# Patient Record
Sex: MALE | Race: WHITE | NOT HISPANIC OR LATINO | Employment: OTHER | URBAN - METROPOLITAN AREA
[De-identification: names, ages, dates, MRNs, and addresses within clinical notes are randomized per-mention and may not be internally consistent; named-entity substitution may affect disease eponyms.]

---

## 2017-04-03 RX ORDER — PRAVASTATIN SODIUM 40 MG/1
TABLET ORAL
Qty: 30 TABLET | Refills: 3 | Status: SHIPPED | OUTPATIENT
Start: 2017-04-03 | End: 2017-09-26 | Stop reason: SDUPTHER

## 2017-09-04 ENCOUNTER — PATIENT MESSAGE (OUTPATIENT)
Dept: FAMILY MEDICINE | Facility: CLINIC | Age: 57
End: 2017-09-04

## 2017-09-26 RX ORDER — PRAVASTATIN SODIUM 40 MG/1
TABLET ORAL
Qty: 30 TABLET | Refills: 3 | Status: SHIPPED | OUTPATIENT
Start: 2017-09-26 | End: 2018-06-11

## 2017-09-26 RX ORDER — PRAVASTATIN SODIUM 40 MG/1
40 TABLET ORAL DAILY
Qty: 30 TABLET | Refills: 3 | Status: SHIPPED | OUTPATIENT
Start: 2017-09-26 | End: 2018-03-31 | Stop reason: SDUPTHER

## 2018-03-31 RX ORDER — PRAVASTATIN SODIUM 40 MG/1
TABLET ORAL
Qty: 30 TABLET | Refills: 3 | Status: SHIPPED | OUTPATIENT
Start: 2018-03-31 | End: 2018-04-02 | Stop reason: SDUPTHER

## 2018-04-02 DIAGNOSIS — Z00.00 ROUTINE GENERAL MEDICAL EXAMINATION AT A HEALTH CARE FACILITY: Primary | ICD-10-CM

## 2018-04-02 DIAGNOSIS — E78.5 HYPERLIPIDEMIA, UNSPECIFIED HYPERLIPIDEMIA TYPE: ICD-10-CM

## 2018-04-02 RX ORDER — PRAVASTATIN SODIUM 40 MG/1
40 TABLET ORAL DAILY
Qty: 90 TABLET | Refills: 0 | Status: SHIPPED | OUTPATIENT
Start: 2018-04-02 | End: 2018-06-11

## 2018-04-02 NOTE — TELEPHONE ENCOUNTER
Please send to [facility] in [place]     pravastatin (PRAVACHOL) 40 MG tablet  Take 1 tablet (40 mg total) by mouth once daily.   Normal, Disp-30 tablet, R-3   Generic For:*PRAVACHOL  40MG

## 2018-04-04 LAB
ALBUMIN SERPL-MCNC: 4.2 G/DL (ref 3.6–5.1)
ALBUMIN/GLOB SERPL: 1.7 (CALC) (ref 1–2.5)
ALP SERPL-CCNC: 84 U/L (ref 40–115)
ALT SERPL-CCNC: 29 U/L (ref 9–46)
AST SERPL-CCNC: 22 U/L (ref 10–35)
BASOPHILS # BLD AUTO: 72 CELLS/UL (ref 0–200)
BASOPHILS NFR BLD AUTO: 1.5 %
BILIRUB SERPL-MCNC: 0.6 MG/DL (ref 0.2–1.2)
BUN SERPL-MCNC: 18 MG/DL (ref 7–25)
BUN/CREAT SERPL: NORMAL (CALC) (ref 6–22)
CALCIUM SERPL-MCNC: 9.1 MG/DL (ref 8.6–10.3)
CHLORIDE SERPL-SCNC: 106 MMOL/L (ref 98–110)
CHOLEST SERPL-MCNC: 216 MG/DL
CHOLEST/HDLC SERPL: 3.8 (CALC)
CO2 SERPL-SCNC: 26 MMOL/L (ref 20–31)
CREAT SERPL-MCNC: 1.1 MG/DL (ref 0.7–1.33)
EOSINOPHIL # BLD AUTO: 490 CELLS/UL (ref 15–500)
EOSINOPHIL NFR BLD AUTO: 10.2 %
ERYTHROCYTE [DISTWIDTH] IN BLOOD BY AUTOMATED COUNT: 12.4 % (ref 11–15)
GFR SERPL CREATININE-BSD FRML MDRD: 75 ML/MIN/1.73M2
GLOBULIN SER CALC-MCNC: 2.5 G/DL (CALC) (ref 1.9–3.7)
GLUCOSE SERPL-MCNC: 96 MG/DL (ref 65–99)
HCT VFR BLD AUTO: 42.3 % (ref 38.5–50)
HDLC SERPL-MCNC: 57 MG/DL
HGB BLD-MCNC: 14.8 G/DL (ref 13.2–17.1)
LDLC SERPL CALC-MCNC: 139 MG/DL (CALC)
LYMPHOCYTES # BLD AUTO: 1267 CELLS/UL (ref 850–3900)
LYMPHOCYTES NFR BLD AUTO: 26.4 %
MCH RBC QN AUTO: 33 PG (ref 27–33)
MCHC RBC AUTO-ENTMCNC: 35 G/DL (ref 32–36)
MCV RBC AUTO: 94.2 FL (ref 80–100)
MONOCYTES # BLD AUTO: 701 CELLS/UL (ref 200–950)
MONOCYTES NFR BLD AUTO: 14.6 %
NEUTROPHILS # BLD AUTO: 2270 CELLS/UL (ref 1500–7800)
NEUTROPHILS NFR BLD AUTO: 47.3 %
NONHDLC SERPL-MCNC: 159 MG/DL (CALC)
PLATELET # BLD AUTO: 231 THOUSAND/UL (ref 140–400)
PMV BLD REES-ECKER: 10.4 FL (ref 7.5–12.5)
POTASSIUM SERPL-SCNC: 4.4 MMOL/L (ref 3.5–5.3)
PROT SERPL-MCNC: 6.7 G/DL (ref 6.1–8.1)
RBC # BLD AUTO: 4.49 MILLION/UL (ref 4.2–5.8)
SODIUM SERPL-SCNC: 139 MMOL/L (ref 135–146)
TRIGL SERPL-MCNC: 96 MG/DL
TSH SERPL-ACNC: 2.83 MIU/L (ref 0.4–4.5)
WBC # BLD AUTO: 4.8 THOUSAND/UL (ref 3.8–10.8)

## 2018-04-20 ENCOUNTER — PATIENT MESSAGE (OUTPATIENT)
Dept: FAMILY MEDICINE | Facility: CLINIC | Age: 58
End: 2018-04-20

## 2018-06-04 ENCOUNTER — PATIENT MESSAGE (OUTPATIENT)
Dept: FAMILY MEDICINE | Facility: CLINIC | Age: 58
End: 2018-06-04

## 2018-06-11 ENCOUNTER — OFFICE VISIT (OUTPATIENT)
Dept: FAMILY MEDICINE | Facility: CLINIC | Age: 58
End: 2018-06-11
Payer: COMMERCIAL

## 2018-06-11 VITALS
BODY MASS INDEX: 28.03 KG/M2 | DIASTOLIC BLOOD PRESSURE: 86 MMHG | HEART RATE: 61 BPM | HEIGHT: 70 IN | SYSTOLIC BLOOD PRESSURE: 116 MMHG | TEMPERATURE: 98 F | OXYGEN SATURATION: 99 % | WEIGHT: 195.81 LBS

## 2018-06-11 DIAGNOSIS — Z00.00 ROUTINE HEALTH MAINTENANCE: Primary | ICD-10-CM

## 2018-06-11 DIAGNOSIS — E78.5 HYPERLIPIDEMIA, UNSPECIFIED HYPERLIPIDEMIA TYPE: ICD-10-CM

## 2018-06-11 DIAGNOSIS — Z23 NEED FOR TDAP VACCINATION: ICD-10-CM

## 2018-06-11 PROCEDURE — 99396 PREV VISIT EST AGE 40-64: CPT | Mod: 25,S$GLB,, | Performed by: FAMILY MEDICINE

## 2018-06-11 PROCEDURE — 90715 TDAP VACCINE 7 YRS/> IM: CPT | Mod: S$GLB,,, | Performed by: FAMILY MEDICINE

## 2018-06-11 PROCEDURE — 90471 IMMUNIZATION ADMIN: CPT | Mod: S$GLB,,, | Performed by: FAMILY MEDICINE

## 2018-06-11 RX ORDER — ATORVASTATIN CALCIUM 40 MG/1
40 TABLET, FILM COATED ORAL DAILY
Qty: 90 TABLET | Refills: 3 | Status: SHIPPED | OUTPATIENT
Start: 2018-06-11 | End: 2018-11-19

## 2018-06-11 NOTE — PROGRESS NOTES
" Patient ID: Morgan Marshall III is a 57 y.o. male.    Chief Complaint: Annual Exam    HPI      Morgan Marshall III is a 57 y.o. male. here for annual exam.   No current complaints.  Doing well overall.  Lipidemia-taking Pravachol without problems that she was on Lipitor at some point in the distant past unsure what changed to Pravachol.  Possibly insurance .      Review of Symptoms    Constitutional: Negative.    HENT: Negative.    Eyes: Negative.    Respiratory: Negative.    Cardiovascular: Negative.    Gastrointestinal: Negative.    Endocrine: Negative.    Genitourinary: Negative.    Musculoskeletal: Negative.    Skin: Negative.    Allergic/Immunologic: Negative.    Neurological: Negative.    Hematological: Negative.    Psychiatric/Behavioral: Negative.      Except as above in HPI      /86 (BP Location: Left arm, Patient Position: Sitting, BP Method: Large (Manual))   Pulse 61   Temp 97.7 °F (36.5 °C)   Ht 5' 10" (1.778 m)   Wt 88.8 kg (195 lb 12.8 oz)   SpO2 99%   BMI 28.09 kg/m²     Physical  Exam    Constitutional:  Oriented to person, place, and time. Appears well-developed and well-nourished.     HENT:   Head: Normocephalic and atraumatic.     Right Ear: Tympanic membrane, external ear and ear canal normal.     Left Ear: Tympanic membrane, external ear and ear canal normal.     Nose: Nose normal. No rhinorrhea or nasal deformity.     Mouth/Throat: Uvula is midline, oropharynx is clear and moist and mucous membranes are normal.      Eyes: Conjunctivae are normal. Right eye exhibits no discharge. Left eye exhibits no discharge. No scleral icterus.     Neck:  No JVD present. No tracheal deviation  [x]  Neck supple.   [x]  No Carotid bruit    Cardiovascular: Normal rate, regular rhythm and normal heart sounds.      Pulmonary/Chest: Effort normal and breath sounds normal. No stridor. No respiratory distress. No wheezes. No rales.      Musculoskeletal: Normal range of motion. No edema or tenderness. "   No deformity     Lymphadenopathy:  No cervical adenopathy.     Neurological:  Alert and oriented to person, place, and time. Coordination normal.     Skin: Skin is warm and dry. No rash noted.     Psychiatric: Normal mood and affect. Speech is normal and behavior is normal. Judgment and thought content normal.     Complete Blood Count  Lab Results   Component Value Date    RBC 4.49 04/03/2018    HGB 14.8 04/03/2018    HCT 42.3 04/03/2018    MCV 94.2 04/03/2018    MCH 33.0 04/03/2018    MCHC 35.0 04/03/2018    RDW 12.4 04/03/2018     04/03/2018    MPV 10.4 04/03/2018    GRAN 3.1 10/03/2016    GRAN 55.3 10/03/2016    LYMPH 1,267 04/03/2018    LYMPH 26.4 04/03/2018    MONO 701 04/03/2018    MONO 14.6 04/03/2018     04/03/2018    BASO 72 04/03/2018    EOSINOPHIL 10.2 04/03/2018    BASOPHIL 1.5 04/03/2018    DIFFMETHOD Automated 10/03/2016       Comprehensive Metabolic Panel  Lab Results   Component Value Date    GLU 96 04/03/2018    BUN 18 04/03/2018    CREATININE 1.10 04/03/2018     04/03/2018    K 4.4 04/03/2018     04/03/2018    PROT 6.7 04/03/2018    ALBUMIN 4.2 04/03/2018    BILITOT 0.6 04/03/2018    AST 22 04/03/2018    ALKPHOS 84 04/03/2018    CO2 26 04/03/2018    ALT 29 04/03/2018    EGFRNONAA 75 04/03/2018    ESTGFRAFRICA 87 04/03/2018       TSH  Lab Results   Component Value Date    TSH 2.83 04/03/2018       Assessment / Plan:      ICD-10-CM ICD-9-CM   1. Routine health maintenance Z00.00 V70.0     Routine health maintenance    Other orders  -     (In Office Administered) Tdap Vaccine  -     Hepatitis C antibody; Future; Expected date: 06/11/2018        Change to Lipitor to help LDL  Discussed how to stay healthy including: diet, exercise, refraining from smoking and discussed screening exams / tests needed for age, sex and family Hx.

## 2019-01-25 ENCOUNTER — TELEPHONE (OUTPATIENT)
Dept: FAMILY MEDICINE | Facility: CLINIC | Age: 59
End: 2019-01-25

## 2019-01-25 NOTE — TELEPHONE ENCOUNTER
----- Message from Natalia Eden sent at 1/25/2019 12:18 PM CST -----  Contact: self, [phone]  Patient requests to be seen by his doctor on Monday 1/28 for surgery clearance.

## 2019-01-28 ENCOUNTER — OFFICE VISIT (OUTPATIENT)
Dept: FAMILY MEDICINE | Facility: CLINIC | Age: 59
End: 2019-01-28
Payer: COMMERCIAL

## 2019-01-28 VITALS
SYSTOLIC BLOOD PRESSURE: 122 MMHG | WEIGHT: 197.06 LBS | TEMPERATURE: 99 F | HEIGHT: 70 IN | HEART RATE: 69 BPM | DIASTOLIC BLOOD PRESSURE: 88 MMHG | OXYGEN SATURATION: 100 % | BODY MASS INDEX: 28.21 KG/M2

## 2019-01-28 DIAGNOSIS — Z23 NEED FOR INFLUENZA VACCINATION: ICD-10-CM

## 2019-01-28 DIAGNOSIS — Z01.818 PREOPERATIVE EXAMINATION: Primary | ICD-10-CM

## 2019-01-28 DIAGNOSIS — E78.5 HYPERLIPIDEMIA, UNSPECIFIED HYPERLIPIDEMIA TYPE: ICD-10-CM

## 2019-01-28 PROCEDURE — 99213 PR OFFICE/OUTPT VISIT, EST, LEVL III, 20-29 MIN: ICD-10-PCS | Mod: S$GLB,,, | Performed by: FAMILY MEDICINE

## 2019-01-28 PROCEDURE — 3008F BODY MASS INDEX DOCD: CPT | Mod: CPTII,S$GLB,, | Performed by: FAMILY MEDICINE

## 2019-01-28 PROCEDURE — 3008F PR BODY MASS INDEX (BMI) DOCUMENTED: ICD-10-PCS | Mod: CPTII,S$GLB,, | Performed by: FAMILY MEDICINE

## 2019-01-28 PROCEDURE — 99213 OFFICE O/P EST LOW 20 MIN: CPT | Mod: S$GLB,,, | Performed by: FAMILY MEDICINE

## 2019-01-28 NOTE — PROGRESS NOTES
" Patient ID: Morgan Marshall III is a 58 y.o. male.    Chief Complaint: Pre-op Exam    HPI      Morgan Marshall III is a 58 y.o. male. here for examination prior to surgery for cervical next for.  Patient with over 10 year history of neck pain with neuralgia.  Physical therapy and traction were helpful.  Recently experiencing weakness of his left arm.  Follow-up with Neurosurgery who recommended surgical intervention.  The no current cardiovascular problems. No respiratory problems.  Recent lab work reviewed and normal including EKG and chest x-ray reports.      Review of Symptoms    Constitutional: Negative.    HENT: Negative.    Eyes: Negative.    Respiratory: Negative.    Cardiovascular: Negative.    Gastrointestinal: Negative.    Endocrine: Negative.    Genitourinary: Negative.    Musculoskeletal: Negative.    Skin: Negative.    Allergic/Immunologic: Negative.    Neurological: Negative.    Hematological: Negative.    Psychiatric/Behavioral: Negative.      Except as above in HPI      /88   Pulse 69   Temp 98.5 °F (36.9 °C)   Ht 5' 10" (1.778 m)   Wt 89.4 kg (197 lb 1.5 oz)   SpO2 100%   BMI 28.28 kg/m²     Physical  Exam    Constitutional:  Oriented to person, place, and time. Appears well-developed and well-nourished.     HENT:   Head: Normocephalic and atraumatic.     Right Ear: Tympanic membrane, ear canal and External ear normal     Left Ear: Tympanic membrane, ear canal and External ear normal     Nose: Nose normal. No rhinorrhea or nasal deformity.     Mouth/Throat: Uvula is midline, oropharynx is clear and moist and mucous membranes are normal.      Eyes: Conjunctivae are normal. Right eye exhibits no discharge. Left eye exhibits no discharge. No scleral icterus.     Neck:  No JVD present. No tracheal deviation  []  Neck supple.   []  No Carotid bruit    Cardiovascular:  Regular rate and rhythm with normal S1 and S2     Pulmonary/Chest:   Clear to auscultation bilaterally without wheezes, " rhonchi or rales    Musculoskeletal: Normal range of motion. No edema or tenderness.   No deformity   Weakness left extremity hands and biceps    Lymphadenopathy:  No cervical adenopathy.     Neurological:  Alert and oriented to person, place, and time. Coordination normal.     Skin: Skin is warm and dry. No rash noted.     Psychiatric: Normal mood and affect. Speech is normal and behavior is normal. Judgment and thought content normal.     Complete Blood Count  Lab Results   Component Value Date    RBC 4.49 04/03/2018    HGB 14.8 04/03/2018    HCT 42.3 04/03/2018    MCV 94.2 04/03/2018    MCH 33.0 04/03/2018    MCHC 35.0 04/03/2018    RDW 12.4 04/03/2018     04/03/2018    MPV 10.4 04/03/2018    GRAN 3.1 10/03/2016    GRAN 55.3 10/03/2016    LYMPH 1,267 04/03/2018    LYMPH 26.4 04/03/2018    MONO 701 04/03/2018    MONO 14.6 04/03/2018     04/03/2018    BASO 72 04/03/2018    EOSINOPHIL 10.2 04/03/2018    BASOPHIL 1.5 04/03/2018    DIFFMETHOD Automated 10/03/2016       Comprehensive Metabolic Panel  No results found for: GLU, BUN, CREATININE, NA, K, CL, PROT, ALBUMIN, BILITOT, AST, ALKPHOS, CO2, ALT, ANIONGAP, EGFRNONAA, ESTGFRAFRICA    TSH  No results found for: TSH    Assessment / Plan:      ICD-10-CM ICD-9-CM   1. Preoperative examination Z01.818 V72.84   2. Need for influenza vaccination Z23 V04.81   3. Hyperlipidemia, unspecified hyperlipidemia type E78.5 272.4     Preoperative examination    Need for influenza vaccination  -     Influenza - Quadrivalent (3 years & older) (PF)    Hyperlipidemia, unspecified hyperlipidemia type  -     Lipid panel; Future          Discussed how to stay healthy including: diet, exercise, refraining from smoking and discussed screening exams / tests needed for age, sex and family Hx.

## 2019-02-22 LAB
CHOLEST SERPL-MCNC: 205 MG/DL
CHOLEST/HDLC SERPL: 3.4 (CALC)
HDLC SERPL-MCNC: 61 MG/DL
LDLC SERPL CALC-MCNC: 125 MG/DL (CALC)
NONHDLC SERPL-MCNC: 144 MG/DL (CALC)
TRIGL SERPL-MCNC: 88 MG/DL

## 2019-07-11 ENCOUNTER — PATIENT MESSAGE (OUTPATIENT)
Dept: FAMILY MEDICINE | Facility: CLINIC | Age: 59
End: 2019-07-11

## 2019-09-23 ENCOUNTER — OFFICE VISIT (OUTPATIENT)
Dept: FAMILY MEDICINE | Facility: CLINIC | Age: 59
End: 2019-09-23
Payer: COMMERCIAL

## 2019-09-23 ENCOUNTER — PATIENT MESSAGE (OUTPATIENT)
Dept: FAMILY MEDICINE | Facility: CLINIC | Age: 59
End: 2019-09-23

## 2019-09-23 VITALS
TEMPERATURE: 98 F | DIASTOLIC BLOOD PRESSURE: 82 MMHG | SYSTOLIC BLOOD PRESSURE: 100 MMHG | HEART RATE: 68 BPM | BODY MASS INDEX: 27.75 KG/M2 | HEIGHT: 70 IN | OXYGEN SATURATION: 96 % | WEIGHT: 193.81 LBS

## 2019-09-23 DIAGNOSIS — R10.9 RIGHT FLANK PAIN: Primary | ICD-10-CM

## 2019-09-23 PROCEDURE — 3008F PR BODY MASS INDEX (BMI) DOCUMENTED: ICD-10-PCS | Mod: CPTII,S$GLB,, | Performed by: FAMILY MEDICINE

## 2019-09-23 PROCEDURE — 3008F BODY MASS INDEX DOCD: CPT | Mod: CPTII,S$GLB,, | Performed by: FAMILY MEDICINE

## 2019-09-23 PROCEDURE — 99213 OFFICE O/P EST LOW 20 MIN: CPT | Mod: S$GLB,,, | Performed by: FAMILY MEDICINE

## 2019-09-23 PROCEDURE — 99213 PR OFFICE/OUTPT VISIT, EST, LEVL III, 20-29 MIN: ICD-10-PCS | Mod: S$GLB,,, | Performed by: FAMILY MEDICINE

## 2019-09-23 RX ORDER — CYCLOBENZAPRINE HCL 5 MG
5 TABLET ORAL 3 TIMES DAILY PRN
Qty: 30 TABLET | Refills: 1 | Status: SHIPPED | OUTPATIENT
Start: 2019-09-23 | End: 2019-10-03

## 2019-09-29 NOTE — PROGRESS NOTES
" Patient ID: Morgan Marshall III is a 59 y.o. male.    Chief Complaint: Abdominal Pain    HPI      Morgan Marshall III is a 59 y.o. male complains of several month history of right flank pain. Pain is along the right flank and abdomen.  This pain is increased in intensity recently.  To the point is moderate to severe intensity often times.  Finds this happens when riding lawn more for.  Time.  Also stays around for an hour so as it read juices intensity post riding along more.  Use of medication even muscle relaxers have not relieved problem.  No nausea vomiting diarrhea.  No change in stool.  Keeping himself well hydrated with clear to lightly yellow fluid.    Vitals:    09/23/19 1639   BP: 100/82   Pulse: 68   Temp: 98.3 °F (36.8 °C)   SpO2: 96%   Weight: 87.9 kg (193 lb 12.6 oz)   Height: 5' 10" (1.778 m)            Review of Symptoms    Constitutional  Neg activity change, No chills /fever   Resp  Neg hemoptysis, stridor, choking  CVS  Neg chest pain, palpitations    Physical Exam    Constitutional:  Oriented to person, place, and time.appears well-developed and well-nourished.  No distress.      HENT  Head: Normocephalic and atraumatic  Right Ear: External ear normal.   Left Ear: External ear normal.   Nose: External nose normal.   Mouth:  Moist mucus membranes.    Eyes:  Conjunctivae are normal. Right eye exhibits no discharge.  Left eye exhibits no discharge. No scleral icterus.  No periorbital edema    Cardiovascular:  Regular rate and rhythm with normal S1 and S2     Pulmonary/Chest:   Clear to auscultation bilaterally without wheezes, rhonchi or rales      Musculoskeletal:  No edema. No obvious deformity No wasting     Abdomen:  Soft non tender, non distended, No hepatic or splenic enlargement.  No rebound or guarding.    Slight tenderness along the lower rib cage.  No skin changes-no abdominal hernias.    Neurological:  Alert and oriented to person, place, and time.   Coordination normal.     Skin:   Skin " is warm and dry.  No diaphoresis.   No rash noted.     Psychiatric: Normal mood and affect. Behavior is normal.  Judgment and thought content normal.     Complete Blood Count  Lab Results   Component Value Date    RBC 4.49 04/03/2018    HGB 14.8 04/03/2018    HCT 42.3 04/03/2018    MCV 94.2 04/03/2018    MCH 33.0 04/03/2018    MCHC 35.0 04/03/2018    RDW 12.4 04/03/2018     04/03/2018    MPV 10.4 04/03/2018    GRAN 3.1 10/03/2016    GRAN 55.3 10/03/2016    LYMPH 1,267 04/03/2018    LYMPH 26.4 04/03/2018    MONO 701 04/03/2018    MONO 14.6 04/03/2018     04/03/2018    BASO 72 04/03/2018    EOSINOPHIL 10.2 04/03/2018    BASOPHIL 1.5 04/03/2018    DIFFMETHOD Automated 10/03/2016       Comprehensive Metabolic Panel  No results found for: GLU, BUN, CREATININE, NA, K, CL, PROT, ALBUMIN, BILITOT, AST, ALKPHOS, CO2, ALT, ANIONGAP, EGFRNONAA, ESTGFRAFRICA    TSH  No results found for: TSH    Assessment / Plan:      ICD-10-CM ICD-9-CM   1. Right flank pain R10.9 789.09     Right flank pain    Other orders  -     cyclobenzaprine (FLEXERIL) 5 MG tablet; Take 1 tablet (5 mg total) by mouth 3 (three) times daily as needed for Muscle spasms.  Dispense: 30 tablet; Refill: 1     Try cyclobenzaprine-try stretching-see if this helps prior to getting along more.

## 2019-09-30 ENCOUNTER — PATIENT MESSAGE (OUTPATIENT)
Dept: FAMILY MEDICINE | Facility: CLINIC | Age: 59
End: 2019-09-30

## 2019-09-30 DIAGNOSIS — R31.9 HEMATURIA, UNSPECIFIED TYPE: Primary | ICD-10-CM

## 2019-09-30 RX ORDER — CIPROFLOXACIN 500 MG/1
500 TABLET ORAL 2 TIMES DAILY
Qty: 14 TABLET | Refills: 0 | Status: SHIPPED | OUTPATIENT
Start: 2019-09-30 | End: 2019-10-28

## 2019-10-02 ENCOUNTER — PATIENT MESSAGE (OUTPATIENT)
Dept: FAMILY MEDICINE | Facility: CLINIC | Age: 59
End: 2019-10-02

## 2019-10-28 ENCOUNTER — OFFICE VISIT (OUTPATIENT)
Dept: FAMILY MEDICINE | Facility: CLINIC | Age: 59
End: 2019-10-28
Payer: COMMERCIAL

## 2019-10-28 VITALS
SYSTOLIC BLOOD PRESSURE: 118 MMHG | HEART RATE: 68 BPM | TEMPERATURE: 98 F | OXYGEN SATURATION: 98 % | HEIGHT: 70 IN | BODY MASS INDEX: 27.28 KG/M2 | WEIGHT: 190.56 LBS | DIASTOLIC BLOOD PRESSURE: 64 MMHG

## 2019-10-28 DIAGNOSIS — N20.0 NEPHROLITHIASIS: Primary | ICD-10-CM

## 2019-10-28 PROCEDURE — 3008F PR BODY MASS INDEX (BMI) DOCUMENTED: ICD-10-PCS | Mod: CPTII,S$GLB,, | Performed by: FAMILY MEDICINE

## 2019-10-28 PROCEDURE — 99212 OFFICE O/P EST SF 10 MIN: CPT | Mod: S$GLB,,, | Performed by: FAMILY MEDICINE

## 2019-10-28 PROCEDURE — 99212 PR OFFICE/OUTPT VISIT, EST, LEVL II, 10-19 MIN: ICD-10-PCS | Mod: S$GLB,,, | Performed by: FAMILY MEDICINE

## 2019-10-28 PROCEDURE — 3008F BODY MASS INDEX DOCD: CPT | Mod: CPTII,S$GLB,, | Performed by: FAMILY MEDICINE

## 2019-10-28 NOTE — PROGRESS NOTES
" Patient ID: Morgan Marshall III is a 59 y.o. male.    Chief Complaint: f/u surgery    HPI       Morgan Marshall III is a 59 y.o. male here following up lithotripsy surgery.  No complaints at this time.  Patient has stent in right ureter.  Plans for removal under anesthesia in the next-day or two.      Review of Symptoms    Constitutional  No change in activity, No chills fever   Resp  Neg hemoptysis, stridor, choking  CVS  Neg chest pain, palpitations    /64   Pulse 68   Temp 98.3 °F (36.8 °C)   Ht 5' 10" (1.778 m)   Wt 86.4 kg (190 lb 9.4 oz)   SpO2 98%   BMI 27.35 kg/m²     Physical Exam    Vitals:    10/28/19 1022   BP: 118/64   Pulse: 68   Temp: 98.3 °F (36.8 °C)   SpO2: 98%   Weight: 86.4 kg (190 lb 9.4 oz)   Height: 5' 10" (1.778 m)       Constitutional:   Oriented to person, place, and time.appears well-developed and well-nourished.   No distress.      HENT  Head: Normocephalic and atraumatic  Right Ear: External ear normal.   Left Ear: External ear normal.   Nose: External nose normal.   Mouth: Moist mucous membranes    Eyes:   Conjunctivae are normal. Right eye exhibits no discharge. Left eye exhibits no discharge. No scleral icterus. No periorbital edema    Musculoskeletal:  No edema. No obvious deformity No wasting     Neurological:  Alert and oriented to person, place, and time. Coordination normal.     Skin:   Skin is warm and dry.  No diaphoresis.   No rash noted.     Psychiatric: Normal mood and affect. Behavior is normal. Judgment and thought content normal.     Complete Blood Count  Lab Results   Component Value Date    RBC 4.49 04/03/2018    HGB 14.8 04/03/2018    HCT 42.3 04/03/2018    MCV 94.2 04/03/2018    MCH 33.0 04/03/2018    MCHC 35.0 04/03/2018    RDW 12.4 04/03/2018     04/03/2018    MPV 10.4 04/03/2018    GRAN 3.1 10/03/2016    GRAN 55.3 10/03/2016    LYMPH 1,267 04/03/2018    LYMPH 26.4 04/03/2018    MONO 701 04/03/2018    MONO 14.6 04/03/2018     04/03/2018    " BASO 72 04/03/2018    EOSINOPHIL 10.2 04/03/2018    BASOPHIL 1.5 04/03/2018    DIFFMETHOD Automated 10/03/2016       Comprehensive Metabolic Panel  No results found for: GLU, BUN, CREATININE, NA, K, CL, PROT, ALBUMIN, BILITOT, AST, ALKPHOS, CO2, ALT, ANIONGAP, EGFRNONAA, ESTGFRAFRICA    TSH  No results found for: TSH    Assessment / Plan:      ICD-10-CM ICD-9-CM   1. Nephrolithiasis N20.0 592.0     Nephrolithiasis     Continue-follow up with urologist-no new medications needed

## 2020-04-05 RX ORDER — ATORVASTATIN CALCIUM 40 MG/1
TABLET, FILM COATED ORAL
Qty: 90 TABLET | Refills: 2 | Status: SHIPPED | OUTPATIENT
Start: 2020-04-05 | End: 2020-04-07 | Stop reason: SDUPTHER

## 2020-04-07 RX ORDER — ATORVASTATIN CALCIUM 40 MG/1
40 TABLET, FILM COATED ORAL DAILY
Qty: 90 TABLET | Refills: 2 | Status: SHIPPED | OUTPATIENT
Start: 2020-04-07 | End: 2021-06-07

## 2020-09-16 ENCOUNTER — TELEPHONE (OUTPATIENT)
Dept: FAMILY MEDICINE | Facility: CLINIC | Age: 60
End: 2020-09-16

## 2020-09-16 ENCOUNTER — PATIENT MESSAGE (OUTPATIENT)
Dept: FAMILY MEDICINE | Facility: CLINIC | Age: 60
End: 2020-09-16

## 2020-09-16 DIAGNOSIS — R50.9 FEVER: ICD-10-CM

## 2020-09-16 NOTE — TELEPHONE ENCOUNTER
Please schedule COVID testing-you can do this tomorrow-sent portal message that it will probably tomorrow after two

## 2020-09-17 ENCOUNTER — LAB VISIT (OUTPATIENT)
Dept: FAMILY MEDICINE | Facility: CLINIC | Age: 60
End: 2020-09-17
Payer: COMMERCIAL

## 2020-09-17 DIAGNOSIS — R50.9 FEVER: ICD-10-CM

## 2020-09-17 LAB — SARS-COV-2 RNA RESP QL NAA+PROBE: NOT DETECTED

## 2020-09-17 PROCEDURE — U0003 INFECTIOUS AGENT DETECTION BY NUCLEIC ACID (DNA OR RNA); SEVERE ACUTE RESPIRATORY SYNDROME CORONAVIRUS 2 (SARS-COV-2) (CORONAVIRUS DISEASE [COVID-19]), AMPLIFIED PROBE TECHNIQUE, MAKING USE OF HIGH THROUGHPUT TECHNOLOGIES AS DESCRIBED BY CMS-2020-01-R: HCPCS

## 2020-09-17 NOTE — TELEPHONE ENCOUNTER
Returning Call  Received: Today  Message Contents   Lesli LANDIN [facility] Staff   Caller: Self/ [phone] (Today,  8:24 AM)             Patient called in returning your call. Please advise.

## 2020-09-18 ENCOUNTER — PATIENT MESSAGE (OUTPATIENT)
Dept: FAMILY MEDICINE | Facility: CLINIC | Age: 60
End: 2020-09-18

## 2021-01-04 ENCOUNTER — PATIENT MESSAGE (OUTPATIENT)
Dept: ADMINISTRATIVE | Facility: HOSPITAL | Age: 61
End: 2021-01-04

## 2021-04-05 ENCOUNTER — PATIENT MESSAGE (OUTPATIENT)
Dept: ADMINISTRATIVE | Facility: HOSPITAL | Age: 61
End: 2021-04-05

## 2021-06-07 RX ORDER — ATORVASTATIN CALCIUM 40 MG/1
TABLET, FILM COATED ORAL
Qty: 90 TABLET | Refills: 1 | Status: SHIPPED | OUTPATIENT
Start: 2021-06-07 | End: 2021-06-07 | Stop reason: SDUPTHER

## 2021-06-07 RX ORDER — ATORVASTATIN CALCIUM 40 MG/1
40 TABLET, FILM COATED ORAL DAILY
Qty: 90 TABLET | Refills: 1 | Status: SHIPPED | OUTPATIENT
Start: 2021-06-07 | End: 2022-06-04

## 2021-07-06 ENCOUNTER — PATIENT MESSAGE (OUTPATIENT)
Dept: ADMINISTRATIVE | Facility: HOSPITAL | Age: 61
End: 2021-07-06

## 2021-07-08 ENCOUNTER — PATIENT MESSAGE (OUTPATIENT)
Dept: FAMILY MEDICINE | Facility: CLINIC | Age: 61
End: 2021-07-08

## 2021-10-04 ENCOUNTER — PATIENT MESSAGE (OUTPATIENT)
Dept: FAMILY MEDICINE | Facility: CLINIC | Age: 61
End: 2021-10-04

## 2022-01-10 ENCOUNTER — PATIENT MESSAGE (OUTPATIENT)
Dept: ADMINISTRATIVE | Facility: HOSPITAL | Age: 62
End: 2022-01-10
Payer: COMMERCIAL

## 2022-02-18 ENCOUNTER — PATIENT MESSAGE (OUTPATIENT)
Dept: FAMILY MEDICINE | Facility: CLINIC | Age: 62
End: 2022-02-18
Payer: COMMERCIAL

## 2022-02-18 DIAGNOSIS — Z00.00 ROUTINE HEALTH MAINTENANCE: Primary | ICD-10-CM

## 2022-02-21 ENCOUNTER — LAB VISIT (OUTPATIENT)
Dept: LAB | Facility: HOSPITAL | Age: 62
End: 2022-02-21
Attending: FAMILY MEDICINE
Payer: COMMERCIAL

## 2022-02-21 ENCOUNTER — PATIENT MESSAGE (OUTPATIENT)
Dept: FAMILY MEDICINE | Facility: CLINIC | Age: 62
End: 2022-02-21
Payer: COMMERCIAL

## 2022-02-21 DIAGNOSIS — Z00.00 ROUTINE HEALTH MAINTENANCE: ICD-10-CM

## 2022-02-21 LAB
ALBUMIN SERPL BCP-MCNC: 3.9 G/DL (ref 3.5–5.2)
ALP SERPL-CCNC: 78 U/L (ref 38–126)
ALT SERPL W/O P-5'-P-CCNC: 40 U/L (ref 10–44)
ANION GAP SERPL CALC-SCNC: 5 MMOL/L (ref 8–16)
AST SERPL-CCNC: 29 U/L (ref 15–46)
BASOPHILS # BLD AUTO: 0.06 K/UL (ref 0–0.2)
BASOPHILS NFR BLD: 0.8 % (ref 0–1.9)
BILIRUB SERPL-MCNC: 0.6 MG/DL (ref 0.1–1)
CALCIUM SERPL-MCNC: 9.1 MG/DL (ref 8.7–10.5)
CHLORIDE SERPL-SCNC: 106 MMOL/L (ref 95–110)
CHOLEST SERPL-MCNC: 184 MG/DL (ref 120–199)
CHOLEST/HDLC SERPL: 3.5 {RATIO} (ref 2–5)
CO2 SERPL-SCNC: 29 MMOL/L (ref 23–29)
CREAT SERPL-MCNC: 1.34 MG/DL (ref 0.5–1.4)
DIFFERENTIAL METHOD: ABNORMAL
EOSINOPHIL # BLD AUTO: 0.1 K/UL (ref 0–0.5)
EOSINOPHIL NFR BLD: 1 % (ref 0–8)
ERYTHROCYTE [DISTWIDTH] IN BLOOD BY AUTOMATED COUNT: 11.8 % (ref 11.5–14.5)
EST. GFR  (AFRICAN AMERICAN): >60 ML/MIN/1.73 M^2
EST. GFR  (NON AFRICAN AMERICAN): 57.6 ML/MIN/1.73 M^2
GLUCOSE SERPL-MCNC: 91 MG/DL (ref 70–110)
HCT VFR BLD AUTO: 40 % (ref 40–54)
HDLC SERPL-MCNC: 53 MG/DL (ref 40–75)
HDLC SERPL: 28.8 % (ref 20–50)
HGB BLD-MCNC: 13.3 G/DL (ref 14–18)
IMM GRANULOCYTES # BLD AUTO: 0.06 K/UL (ref 0–0.04)
IMM GRANULOCYTES NFR BLD AUTO: 0.8 % (ref 0–0.5)
LDLC SERPL CALC-MCNC: 117 MG/DL (ref 63–159)
LYMPHOCYTES # BLD AUTO: 2.7 K/UL (ref 1–4.8)
LYMPHOCYTES NFR BLD: 34.9 % (ref 18–48)
MCH RBC QN AUTO: 32.4 PG (ref 27–31)
MCHC RBC AUTO-ENTMCNC: 33.3 G/DL (ref 32–36)
MCV RBC AUTO: 98 FL (ref 82–98)
MONOCYTES # BLD AUTO: 0.8 K/UL (ref 0.3–1)
MONOCYTES NFR BLD: 10.5 % (ref 4–15)
NEUTROPHILS # BLD AUTO: 4.1 K/UL (ref 1.8–7.7)
NEUTROPHILS NFR BLD: 52 % (ref 38–73)
NONHDLC SERPL-MCNC: 131 MG/DL
NRBC BLD-RTO: 0 /100 WBC
PLATELET # BLD AUTO: 262 K/UL (ref 150–450)
PMV BLD AUTO: 10 FL (ref 9.2–12.9)
POTASSIUM SERPL-SCNC: 4.7 MMOL/L (ref 3.5–5.1)
PROT SERPL-MCNC: 6.9 G/DL (ref 6–8.4)
RBC # BLD AUTO: 4.1 M/UL (ref 4.6–6.2)
SODIUM SERPL-SCNC: 140 MMOL/L (ref 136–145)
TRIGL SERPL-MCNC: 70 MG/DL (ref 30–150)
TSH SERPL DL<=0.005 MIU/L-ACNC: 2.49 UIU/ML (ref 0.4–4)
UUN UR-MCNC: 25 MG/DL (ref 2–20)
WBC # BLD AUTO: 7.82 K/UL (ref 3.9–12.7)

## 2022-02-21 PROCEDURE — 80053 COMPREHEN METABOLIC PANEL: CPT | Mod: PO | Performed by: FAMILY MEDICINE

## 2022-02-21 PROCEDURE — 84443 ASSAY THYROID STIM HORMONE: CPT | Mod: PO | Performed by: FAMILY MEDICINE

## 2022-02-21 PROCEDURE — 85025 COMPLETE CBC W/AUTO DIFF WBC: CPT | Mod: PO | Performed by: FAMILY MEDICINE

## 2022-02-21 PROCEDURE — 36415 COLL VENOUS BLD VENIPUNCTURE: CPT | Mod: PO | Performed by: FAMILY MEDICINE

## 2022-02-21 PROCEDURE — 80061 LIPID PANEL: CPT | Performed by: FAMILY MEDICINE

## 2022-02-24 ENCOUNTER — PATIENT MESSAGE (OUTPATIENT)
Dept: FAMILY MEDICINE | Facility: CLINIC | Age: 62
End: 2022-02-24
Payer: COMMERCIAL

## 2022-02-24 RX ORDER — PREDNISONE 20 MG/1
TABLET ORAL
Qty: 5 TABLET | Refills: 0 | Status: SHIPPED | OUTPATIENT
Start: 2022-02-24 | End: 2022-10-31

## 2022-02-24 RX ORDER — AZITHROMYCIN 500 MG/1
500 TABLET, FILM COATED ORAL DAILY
Qty: 3 TABLET | Refills: 0 | Status: SHIPPED | OUTPATIENT
Start: 2022-02-24 | End: 2022-10-31

## 2022-05-31 ENCOUNTER — PATIENT MESSAGE (OUTPATIENT)
Dept: ADMINISTRATIVE | Facility: HOSPITAL | Age: 62
End: 2022-05-31
Payer: COMMERCIAL

## 2022-06-04 RX ORDER — ATORVASTATIN CALCIUM 40 MG/1
TABLET, FILM COATED ORAL
Qty: 90 TABLET | Refills: 0 | Status: SHIPPED | OUTPATIENT
Start: 2022-06-04 | End: 2022-11-25 | Stop reason: SDUPTHER

## 2022-06-04 NOTE — TELEPHONE ENCOUNTER
Care Due:                  Date            Visit Type   Department     Provider  --------------------------------------------------------------------------------    Last Visit: None Found      None         None Found  Next Visit: None Scheduled  None         None Found                                                            Last  Test          Frequency    Reason                     Performed    Due Date  --------------------------------------------------------------------------------    Office Visit  12 months..  atorvastatin.............  Not Found    Overdue    [employer] Embedded Care Gaps. Reference number: [id number]. 6/04/2022   9:34:42 AM CDT

## 2022-06-04 NOTE — TELEPHONE ENCOUNTER
Refill Routing Note   Medication(s) are not appropriate for processing by [facility] for the following reason(s):      - Patient has not been seen in over 15 months by PCP    [facility] action(s):  Defer Medication-related problems identified: Requires appointment        Medication reconciliation completed: No     Appointments  past 12m or future 3m with PCP    Date Provider   Last Visit   10/28/2019 Thompson Rene MD   Next Visit   Visit date not found Thompson Rene MD   ED visits in past 90 days: 0        Note composed:12:39 PM 06/04/2022

## 2022-09-16 ENCOUNTER — TELEPHONE (OUTPATIENT)
Dept: FAMILY MEDICINE | Facility: CLINIC | Age: 62
End: 2022-09-16
Payer: COMMERCIAL

## 2022-09-16 NOTE — TELEPHONE ENCOUNTER
Contacted pt to schedule an apt.  He said apt no longer needed.  He spoke to the Dr.     ----- Message from Johanne Anderson sent at 9/16/2022 10:01 AM CDT -----  Regarding: appt and lab orders  Contact: self  The pt would like to get an appt scheduled to see Dr Rene on 9-27-22.  His wife has an appt with Dr Soni on that day @ 2:20 and he was trying to come at the same time.  He also wants to do labs prior to coming in.  Please call him at [phone].

## 2022-09-27 DIAGNOSIS — Z00.00 PREVENTATIVE HEALTH CARE: ICD-10-CM

## 2022-09-27 DIAGNOSIS — Z12.5 PROSTATE CANCER SCREENING: ICD-10-CM

## 2022-09-27 DIAGNOSIS — R73.9 HYPERGLYCEMIA: ICD-10-CM

## 2022-09-27 DIAGNOSIS — Z13.6 SCREENING FOR CARDIOVASCULAR CONDITION: Primary | ICD-10-CM

## 2022-10-27 ENCOUNTER — LAB VISIT (OUTPATIENT)
Dept: LAB | Facility: HOSPITAL | Age: 62
End: 2022-10-27
Attending: STUDENT IN AN ORGANIZED HEALTH CARE EDUCATION/TRAINING PROGRAM
Payer: COMMERCIAL

## 2022-10-27 DIAGNOSIS — Z12.5 PROSTATE CANCER SCREENING: ICD-10-CM

## 2022-10-27 DIAGNOSIS — Z13.6 SCREENING FOR CARDIOVASCULAR CONDITION: ICD-10-CM

## 2022-10-27 DIAGNOSIS — Z00.00 PREVENTATIVE HEALTH CARE: ICD-10-CM

## 2022-10-27 LAB
ALBUMIN SERPL BCP-MCNC: 4.5 G/DL (ref 3.5–5.2)
ALP SERPL-CCNC: 107 U/L (ref 38–126)
ALT SERPL W/O P-5'-P-CCNC: 37 U/L (ref 10–44)
ANION GAP SERPL CALC-SCNC: 7 MMOL/L (ref 8–16)
AST SERPL-CCNC: 27 U/L (ref 15–46)
BASOPHILS # BLD AUTO: 0.1 K/UL (ref 0–0.2)
BASOPHILS NFR BLD: 2.1 % (ref 0–1.9)
BILIRUB SERPL-MCNC: 0.8 MG/DL (ref 0.1–1)
CALCIUM SERPL-MCNC: 9.5 MG/DL (ref 8.7–10.5)
CHLORIDE SERPL-SCNC: 106 MMOL/L (ref 95–110)
CHOLEST SERPL-MCNC: 207 MG/DL (ref 120–199)
CHOLEST/HDLC SERPL: 3.7 {RATIO} (ref 2–5)
CO2 SERPL-SCNC: 31 MMOL/L (ref 23–29)
COMPLEXED PSA SERPL-MCNC: 0.3 NG/ML (ref 0–4)
CREAT SERPL-MCNC: 1.18 MG/DL (ref 0.5–1.4)
DIFFERENTIAL METHOD: ABNORMAL
EOSINOPHIL # BLD AUTO: 0.4 K/UL (ref 0–0.5)
EOSINOPHIL NFR BLD: 7.6 % (ref 0–8)
ERYTHROCYTE [DISTWIDTH] IN BLOOD BY AUTOMATED COUNT: 11.9 % (ref 11.5–14.5)
EST. GFR  (NO RACE VARIABLE): >60 ML/MIN/1.73 M^2
ESTIMATED AVG GLUCOSE: 100 MG/DL (ref 68–131)
GLUCOSE SERPL-MCNC: 96 MG/DL (ref 70–110)
HBA1C MFR BLD: 5.1 % (ref 4–5.6)
HCT VFR BLD AUTO: 44.7 % (ref 40–54)
HDLC SERPL-MCNC: 56 MG/DL (ref 40–75)
HDLC SERPL: 27.1 % (ref 20–50)
HGB BLD-MCNC: 15 G/DL (ref 14–18)
IMM GRANULOCYTES # BLD AUTO: 0.02 K/UL (ref 0–0.04)
IMM GRANULOCYTES NFR BLD AUTO: 0.4 % (ref 0–0.5)
LDLC SERPL CALC-MCNC: 128 MG/DL (ref 63–159)
LYMPHOCYTES # BLD AUTO: 1.2 K/UL (ref 1–4.8)
LYMPHOCYTES NFR BLD: 24.4 % (ref 18–48)
MCH RBC QN AUTO: 32.3 PG (ref 27–31)
MCHC RBC AUTO-ENTMCNC: 33.6 G/DL (ref 32–36)
MCV RBC AUTO: 96 FL (ref 82–98)
MONOCYTES # BLD AUTO: 0.6 K/UL (ref 0.3–1)
MONOCYTES NFR BLD: 11.6 % (ref 4–15)
NEUTROPHILS # BLD AUTO: 2.6 K/UL (ref 1.8–7.7)
NEUTROPHILS NFR BLD: 53.9 % (ref 38–73)
NONHDLC SERPL-MCNC: 151 MG/DL
NRBC BLD-RTO: 0 /100 WBC
PLATELET # BLD AUTO: 268 K/UL (ref 150–450)
PMV BLD AUTO: 10.3 FL (ref 9.2–12.9)
POTASSIUM SERPL-SCNC: 4.8 MMOL/L (ref 3.5–5.1)
PROT SERPL-MCNC: 7.3 G/DL (ref 6–8.4)
RBC # BLD AUTO: 4.65 M/UL (ref 4.6–6.2)
SODIUM SERPL-SCNC: 144 MMOL/L (ref 136–145)
TRIGL SERPL-MCNC: 115 MG/DL (ref 30–150)
TSH SERPL DL<=0.005 MIU/L-ACNC: 2.29 UIU/ML (ref 0.4–4)
UUN UR-MCNC: 19 MG/DL (ref 2–20)
WBC # BLD AUTO: 4.84 K/UL (ref 3.9–12.7)

## 2022-10-27 PROCEDURE — 85025 COMPLETE CBC W/AUTO DIFF WBC: CPT | Mod: PO | Performed by: STUDENT IN AN ORGANIZED HEALTH CARE EDUCATION/TRAINING PROGRAM

## 2022-10-27 PROCEDURE — 84443 ASSAY THYROID STIM HORMONE: CPT | Mod: PO | Performed by: STUDENT IN AN ORGANIZED HEALTH CARE EDUCATION/TRAINING PROGRAM

## 2022-10-27 PROCEDURE — 83036 HEMOGLOBIN GLYCOSYLATED A1C: CPT | Performed by: STUDENT IN AN ORGANIZED HEALTH CARE EDUCATION/TRAINING PROGRAM

## 2022-10-27 PROCEDURE — 36415 COLL VENOUS BLD VENIPUNCTURE: CPT | Mod: PO | Performed by: STUDENT IN AN ORGANIZED HEALTH CARE EDUCATION/TRAINING PROGRAM

## 2022-10-27 PROCEDURE — 80061 LIPID PANEL: CPT | Performed by: STUDENT IN AN ORGANIZED HEALTH CARE EDUCATION/TRAINING PROGRAM

## 2022-10-27 PROCEDURE — 80053 COMPREHEN METABOLIC PANEL: CPT | Mod: PO | Performed by: STUDENT IN AN ORGANIZED HEALTH CARE EDUCATION/TRAINING PROGRAM

## 2022-10-27 PROCEDURE — 84153 ASSAY OF PSA TOTAL: CPT | Performed by: STUDENT IN AN ORGANIZED HEALTH CARE EDUCATION/TRAINING PROGRAM

## 2022-10-31 ENCOUNTER — OFFICE VISIT (OUTPATIENT)
Dept: FAMILY MEDICINE | Facility: CLINIC | Age: 62
End: 2022-10-31
Payer: COMMERCIAL

## 2022-10-31 VITALS
OXYGEN SATURATION: 95 % | HEIGHT: 70 IN | WEIGHT: 203.94 LBS | TEMPERATURE: 98 F | DIASTOLIC BLOOD PRESSURE: 88 MMHG | SYSTOLIC BLOOD PRESSURE: 126 MMHG | HEART RATE: 59 BPM | BODY MASS INDEX: 29.2 KG/M2

## 2022-10-31 DIAGNOSIS — Z00.00 ROUTINE HEALTH MAINTENANCE: Primary | ICD-10-CM

## 2022-10-31 PROCEDURE — 3074F PR MOST RECENT SYSTOLIC BLOOD PRESSURE < 130 MM HG: ICD-10-PCS | Mod: CPTII,S$GLB,, | Performed by: FAMILY MEDICINE

## 2022-10-31 PROCEDURE — 3044F PR MOST RECENT HEMOGLOBIN A1C LEVEL <7.0%: ICD-10-PCS | Mod: CPTII,S$GLB,, | Performed by: FAMILY MEDICINE

## 2022-10-31 PROCEDURE — 1159F MED LIST DOCD IN RCRD: CPT | Mod: CPTII,S$GLB,, | Performed by: FAMILY MEDICINE

## 2022-10-31 PROCEDURE — 3074F SYST BP LT 130 MM HG: CPT | Mod: CPTII,S$GLB,, | Performed by: FAMILY MEDICINE

## 2022-10-31 PROCEDURE — 99396 PREV VISIT EST AGE 40-64: CPT | Mod: S$GLB,,, | Performed by: FAMILY MEDICINE

## 2022-10-31 PROCEDURE — 3079F PR MOST RECENT DIASTOLIC BLOOD PRESSURE 80-89 MM HG: ICD-10-PCS | Mod: CPTII,S$GLB,, | Performed by: FAMILY MEDICINE

## 2022-10-31 PROCEDURE — 3044F HG A1C LEVEL LT 7.0%: CPT | Mod: CPTII,S$GLB,, | Performed by: FAMILY MEDICINE

## 2022-10-31 PROCEDURE — 3079F DIAST BP 80-89 MM HG: CPT | Mod: CPTII,S$GLB,, | Performed by: FAMILY MEDICINE

## 2022-10-31 PROCEDURE — 3008F BODY MASS INDEX DOCD: CPT | Mod: CPTII,S$GLB,, | Performed by: FAMILY MEDICINE

## 2022-10-31 PROCEDURE — 99396 PR PREVENTIVE VISIT,EST,40-64: ICD-10-PCS | Mod: S$GLB,,, | Performed by: FAMILY MEDICINE

## 2022-10-31 PROCEDURE — 1159F PR MEDICATION LIST DOCUMENTED IN MEDICAL RECORD: ICD-10-PCS | Mod: CPTII,S$GLB,, | Performed by: FAMILY MEDICINE

## 2022-10-31 PROCEDURE — 3008F PR BODY MASS INDEX (BMI) DOCUMENTED: ICD-10-PCS | Mod: CPTII,S$GLB,, | Performed by: FAMILY MEDICINE

## 2022-10-31 NOTE — PROGRESS NOTES
" Patient ID: Morgan Marshall III is a 62 y.o. male.    Chief Complaint: Follow-up    HPI      Morgan Marshall III is a 62 y.o. male. here for annual exam.   No current complaints.    The 10-year ASCVD risk score (Lyle JC, et al., 2019) is: 8%    Values used to calculate the score:      Age: 62 years      Sex: Male      Is Non- : No      Diabetic: No      Tobacco smoker: No      Systolic Blood Pressure: 126 mmHg      Is BP treated: No      HDL Cholesterol: 56 mg/dL      Total Cholesterol: 207 mg/dL      Review of Symptoms    Constitutional: Negative.    HENT: Negative.    Eyes: Negative.    Respiratory: Negative.    Cardiovascular: Negative.    Gastrointestinal: Negative.    Endocrine: Negative.    Genitourinary: Negative.    Musculoskeletal: Negative.    Skin: Negative.    Allergic/Immunologic: Negative.    Neurological: Negative.    Hematological: Negative.    Psychiatric/Behavioral: Negative.      Except as above in HPI      Vitals:    10/31/22 1612   BP: 126/88   BP Location: Right arm   Patient Position: Sitting   Pulse: (!) 59   Temp: 97.5 °F (36.4 °C)   TempSrc: Oral   SpO2: 95%   Weight: 92.5 kg (203 lb 14.8 oz)   Height: 5' 10" (1.778 m)        Physical  Exam      Constitutional:  Oriented to person, place, and time. Appears well-developed and well-nourished.     HENT:   Head: Normocephalic and atraumatic.     Right Ear: Tympanic membrane, ear canal and External ear normal     Left Ear: Tympanic membrane, ear canal and External ear normal     Nose: Nose normal. No rhinorrhea or nasal deformity.     Mouth/Throat: Uvula is midline, oropharynx is clear and moist and mucous membranes are normal.      Eyes: Conjunctivae are normal. Right eye exhibits no discharge. Left eye exhibits no discharge. No scleral icterus.     Neck:  No JVD present. No tracheal deviation  []  Neck supple.   []  No Carotid bruit    Cardiovascular:  Regular rate and rhythm with normal S1 and S2 "     Pulmonary/Chest:   Clear to auscultation bilaterally without wheezes, rhonchi or rales    Musculoskeletal: Normal range of motion. No edema or tenderness.   No deformity     Lymphadenopathy:  No cervical adenopathy.     Neurological:  Alert and oriented to person, place, and time. Coordination normal.     Skin: Skin is warm and dry. No rash noted.     Psychiatric: Normal mood and affect. Speech is normal and behavior is normal. Judgment and thought content normal.     Complete Blood Count  Lab Results   Component Value Date    RBC 4.65 10/27/2022    HGB 15.0 10/27/2022    HCT 44.7 10/27/2022    MCV 96 10/27/2022    MCH 32.3 (H) 10/27/2022    MCHC 33.6 10/27/2022    RDW 11.9 10/27/2022     10/27/2022    MPV 10.3 10/27/2022    GRAN 2.6 10/27/2022    GRAN 53.9 10/27/2022    LYMPH 1.2 10/27/2022    LYMPH 24.4 10/27/2022    MONO 0.6 10/27/2022    MONO 11.6 10/27/2022    EOS 0.4 10/27/2022    BASO 0.10 10/27/2022    EOSINOPHIL 7.6 10/27/2022    BASOPHIL 2.1 (H) 10/27/2022    DIFFMETHOD Automated 10/27/2022       Comprehensive Metabolic Panel  Lab Results   Component Value Date    GLU 96 10/27/2022    BUN 19 10/27/2022    CREATININE 1.18 10/27/2022     10/27/2022    K 4.8 10/27/2022     10/27/2022    PROT 7.3 10/27/2022    ALBUMIN 4.5 10/27/2022    BILITOT 0.8 10/27/2022    AST 27 10/27/2022    ALKPHOS 107 10/27/2022    CO2 31 (H) 10/27/2022    ALT 37 10/27/2022    ANIONGAP 7 (L) 10/27/2022       TSH  Lab Results   Component Value Date    TSH 2.290 10/27/2022       Assessment / Plan:      ICD-10-CM ICD-9-CM   1. Routine health maintenance  Z00.00 V70.0     Routine health maintenance  Doing well overall with no new problems.  Discussed use cholesterol-lowering medication.  And current risk ratio      Discussed how to stay healthy including: diet, and exercise.

## 2022-11-06 NOTE — PROGRESS NOTES
Your bloodwork looks fine and does not require any change in treatment.     Please contact me if you have any additional concerns.    Sincerely,    Avel Soni
Labs/EKG/Medications

## 2022-11-12 ENCOUNTER — HOSPITAL ENCOUNTER (EMERGENCY)
Facility: HOSPITAL | Age: 62
Discharge: HOME | End: 2022-11-12
Attending: EMERGENCY MEDICINE
Payer: COMMERCIAL

## 2022-11-12 VITALS
HEIGHT: 70 IN | OXYGEN SATURATION: 100 % | RESPIRATION RATE: 18 BRPM | DIASTOLIC BLOOD PRESSURE: 76 MMHG | SYSTOLIC BLOOD PRESSURE: 130 MMHG | HEART RATE: 65 BPM | WEIGHT: 205 LBS | BODY MASS INDEX: 29.35 KG/M2 | TEMPERATURE: 98 F
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
## 2022-11-12 DIAGNOSIS — S61.411A LACERATION OF SKIN OF RIGHT HAND, INITIAL ENCOUNTER: Primary | ICD-10-CM

## 2022-11-12 PROCEDURE — 12002 RPR S/N/AX/GEN/TRNK2.6-7.5CM: CPT | Mod: ER

## 2022-11-12 PROCEDURE — 99284 EMERGENCY DEPT VISIT MOD MDM: CPT | Mod: 25,ER

## 2022-11-12 PROCEDURE — 25000003 PHARM REV CODE 250: Mod: ER | Performed by: PHYSICIAN ASSISTANT

## 2022-11-12 RX ORDER — ONDANSETRON 4 MG/1
4 TABLET, ORALLY DISINTEGRATING ORAL EVERY 12 HOURS PRN
Qty: 4 TABLET | Refills: 0 | Status: SHIPPED | OUTPATIENT
Start: 2022-11-12

## 2022-11-12 RX ORDER — MUPIROCIN 20 MG/G
1 OINTMENT TOPICAL
Status: COMPLETED | OUTPATIENT
Start: 2022-11-12 | End: 2022-11-12

## 2022-11-12 RX ORDER — AMOXICILLIN AND CLAVULANATE POTASSIUM 875; 125 MG/1; MG/1
1 TABLET, FILM COATED ORAL
Status: COMPLETED | OUTPATIENT
Start: 2022-11-12 | End: 2022-11-12

## 2022-11-12 RX ORDER — AMOXICILLIN AND CLAVULANATE POTASSIUM 875; 125 MG/1; MG/1
1 TABLET, FILM COATED ORAL EVERY 12 HOURS
Qty: 14 TABLET | Refills: 0 | Status: SHIPPED | OUTPATIENT
Start: 2022-11-12 | End: 2022-11-19

## 2022-11-12 RX ORDER — ACETAMINOPHEN 325 MG/1
650 TABLET ORAL
Status: COMPLETED | OUTPATIENT
Start: 2022-11-12 | End: 2022-11-12

## 2022-11-12 RX ORDER — LIDOCAINE HYDROCHLORIDE 10 MG/ML
10 INJECTION, SOLUTION EPIDURAL; INFILTRATION; INTRACAUDAL; PERINEURAL
Status: COMPLETED | OUTPATIENT
Start: 2022-11-12 | End: 2022-11-12

## 2022-11-12 RX ORDER — HYDROCODONE BITARTRATE AND ACETAMINOPHEN 5; 325 MG/1; MG/1
1 TABLET ORAL EVERY 12 HOURS PRN
Qty: 4 TABLET | Refills: 0 | Status: SHIPPED | OUTPATIENT
Start: 2022-11-12 | End: 2022-11-14

## 2022-11-12 RX ORDER — IBUPROFEN 600 MG/1
600 TABLET ORAL
Status: COMPLETED | OUTPATIENT
Start: 2022-11-12 | End: 2022-11-12

## 2022-11-12 RX ADMIN — ACETAMINOPHEN 650 MG: 325 TABLET ORAL at 05:11

## 2022-11-12 RX ADMIN — AMOXICILLIN AND CLAVULANATE POTASSIUM 1 TABLET: 875; 125 TABLET, FILM COATED ORAL at 05:11

## 2022-11-12 RX ADMIN — LIDOCAINE HYDROCHLORIDE 100 MG: 10 INJECTION, SOLUTION EPIDURAL; INFILTRATION; INTRACAUDAL; PERINEURAL at 05:11

## 2022-11-12 RX ADMIN — MUPIROCIN 22 G: 20 OINTMENT TOPICAL at 06:11

## 2022-11-12 RX ADMIN — IBUPROFEN 600 MG: 600 TABLET, FILM COATED ORAL at 05:11

## 2022-11-13 NOTE — ED PROVIDER NOTES
"Encounter Date: 11/12/2022       History     Chief Complaint   Patient presents with    Animal Bite     Pt reports being bit by daughter's pit bull on right hand. Dressing CDI. Dog UTD on vaccines, pt states tetanus UTD. Denies numbness or tingling, can move all digits.      61 y/o male with history of anemia, presents to the ER for evaluation due to dog bite to right hand today.  Patient reports that he was at his home when he "got in between 2 dogs" and was bit by one of his daughters dogs.  The dogs are up to date on immunizations and patients last tetanus immunization was in 2018.  Patients pain is rated 8-9/10.  He has not taken anything for pain prior to coming to the ER.  He denies numbness or tingling of the hand, decreased ROM or additional injuries at this time.        Review of patient's allergies indicates:  No Known Allergies  Past Medical History:   Diagnosis Date    Allergy     Hyperlipidemia     Iron deficiency anemia 2015     Past Surgical History:   Procedure Laterality Date    COLONOSCOPY  04/01/2015    due 10 yrs, dr bravo    kidney stones      SINUS SURGERY      SINUS SURGERY  12/13/2018    Septoplasty, SMR, Destruction Lesion, Maxillary, Ethmoid, Frontal, Sphenoid, Draf I, IGS    TONSILLECTOMY      WISDOM TOOTH EXTRACTION      WRIST SURGERY       Family History   Problem Relation Age of Onset    Hyperlipidemia Father     Hyperlipidemia Sister     Hyperlipidemia Brother     Hyperlipidemia Sister      Social History     Tobacco Use    Smoking status: Never   Substance Use Topics    Alcohol use: No    Drug use: No     Review of Systems   Constitutional:  Negative for chills and fever.   Respiratory:  Negative for shortness of breath.    Cardiovascular:  Negative for chest pain.   Gastrointestinal:  Negative for nausea and vomiting.   Musculoskeletal:  Negative for back pain.   Skin:  Positive for wound. Negative for rash.   Neurological:  Negative for weakness and numbness.   Hematological:  " Does not bruise/bleed easily.     Physical Exam     Initial Vitals [11/12/22 1632]   BP Pulse Resp Temp SpO2   (!) 141/86 66 18 98.1 °F (36.7 °C) 100 %      MAP       --         Physical Exam    Nursing note and vitals reviewed.  Constitutional: He appears well-developed and well-nourished.   HENT:   Head: Atraumatic.   Eyes: Conjunctivae and EOM are normal. Pupils are equal, round, and reactive to light.   Neck: Neck supple.   Normal range of motion.  Cardiovascular:  Normal rate.           Pulmonary/Chest: Breath sounds normal. No respiratory distress.   Musculoskeletal:      Left hand: No bony tenderness. Normal range of motion (normal ROM against resistance of all digits). Normal strength. Decreased sensation (slight decrease sensation on proximal border of wound otherwise normal sensation of hand and all digits). Normal sensation of the ulnar distribution, median distribution and radial distribution.        Hands:       Cervical back: Normal range of motion and neck supple.     Neurological: He is alert and oriented to person, place, and time. He has normal strength. GCS score is 15. GCS eye subscore is 4. GCS verbal subscore is 5. GCS motor subscore is 6.   Skin: Skin is warm. No rash noted.   Psychiatric: He has a normal mood and affect.       ED Course   Lac Repair    Date/Time: 11/12/2022 6:28 PM  Performed by: FACUNDO Corado  Authorized by: Getachew Stewart MD     Consent:     Consent obtained:  Verbal    Consent given by:  Patient    Risks, benefits, and alternatives were discussed: yes      Risks discussed:  Infection, pain, need for additional repair, poor cosmetic result and poor wound healing    Alternatives discussed:  No treatment, delayed treatment, referral and observation  Universal protocol:     Patient identity confirmed:  Verbally with patient  Anesthesia:     Anesthesia method:  Local infiltration    Local anesthetic:  Lidocaine 1% w/o epi  Laceration details:     Location:  Hand     Hand location:  R hand, dorsum    Length (cm):  4  Pre-procedure details:     Preparation:  Patient was prepped and draped in usual sterile fashion and imaging obtained to evaluate for foreign bodies  Exploration:     Limited defect created (wound extended): no      Hemostasis achieved with:  Direct pressure    Imaging obtained: x-ray      Imaging outcome: foreign body not noted      Wound exploration: wound explored through full range of motion and entire depth of wound visualized      Wound extent: no tendon damage noted and no underlying fracture noted      Contaminated: no    Treatment:     Area cleansed with:  Povidone-iodine, chlorhexidine and saline    Amount of cleaning:  Extensive    Irrigation solution:  Sterile water    Irrigation volume:  3 liters    Irrigation method:  Syringe    Debridement:  None    Undermining:  None  Skin repair:     Repair method:  Sutures    Suture size:  3-0    Suture material:  Nylon    Suture technique:  Simple interrupted    Number of sutures:  1  Approximation:     Approximation:  Loose  Repair type:     Repair type:  Simple  Post-procedure details:     Dressing:  Antibiotic ointment (steri strips to approximate wound)    Procedure completion:  Tolerated well, no immediate complications  Labs Reviewed - No data to display       Imaging Results              X-Ray Hand 3 view Right (Final result)  Result time 11/12/22 17:22:53      Final result by Ramy Baldwin MD (11/12/22 17:22:53)                   Impression:      No acute abnormality identified.      Electronically signed by: Ramy Baldwin  Date:    11/12/2022  Time:    17:22               Narrative:    EXAMINATION:  XR HAND COMPLETE 3 VIEW RIGHT    CLINICAL HISTORY:  dog bite;.    TECHNIQUE:  PA, lateral, and oblique views of the right hand were performed.    COMPARISON:  None    FINDINGS:  No evidence of fracture or dislocation.   No evidence of radiopaque or radiolucent foreign body.  Joint spaces appear well  maintained.  Distal radial fixation device intact.  Trace soft tissue gas overlying the thenar eminence.                                       Medications   ibuprofen tablet 600 mg (600 mg Oral Given 22)   acetaminophen tablet 650 mg (650 mg Oral Given 22)   amoxicillin-clavulanate 875-125mg per tablet 1 tablet (1 tablet Oral Given 22)   LIDOcaine (PF) 10 mg/ml (1%) injection 100 mg (100 mg Intradermal Given by Provider 22)   mupirocin 2 % ointment 22 g (22 g Topical (Top) Given 22)           APC / Resident Notes:   Patients presents with right hand wound s/p dog bite today.  Dogs up to date on immunization and patient is up to date on tetanus.  He is given prophylactic dose of Augmentin.  Xray shows no underlying fracture, no foreign body.  Patient has normal strength, normal ROM and sensation of digits, and no evidence of tendon injury.  Patients wound is irrigated copiously and cleansed.  I discussed the care of this patient with my supervising MD and the patient was also seen by him.  Due to gaping wound, 1 suture is placed loosely in center of 4 cm laceration. Patient understands wound is not closed completely due to risk of infection.  Patient will continue Augmentin antibiotic as prescribed, motrin and norco if needed for pain control. Referral is placed for hand surgery/orthopedics for follow up and wound check.  Advised suture removal in 7 days, follow up with PCP in 2 days for wound check and he is given strict ER return precautions.    Patient and family comfortable with discharge plan.  They are given ER return precautions.       Attending Attestation:     Physician Attestation Statement for NP/PA:   I have conducted a face to face encounter with this patient in addition to the NP/PA, due to NP/PA Request    Other NP/PA Attestation Additions:      Medical Decision Makin-year-old male status post dog bite with gaping wound to the hand.  No  evidence of foreign bodies.  Agree with plan.                        Clinical Impression:   Final diagnoses:  [W54.0XXA] Dog bite, initial encounter  [S61.411A] Laceration of skin of right hand, initial encounter (Primary)        ED Disposition Condition    Discharge Stable          ED Prescriptions       Medication Sig Dispense Start Date End Date Auth. Provider    amoxicillin-clavulanate 875-125mg (AUGMENTIN) 875-125 mg per tablet Take 1 tablet by mouth every 12 (twelve) hours. for 7 days 14 tablet 11/12/2022 11/19/2022 FACUNDO Corado    HYDROcodone-acetaminophen (NORCO) 5-325 mg per tablet Take 1 tablet by mouth every 12 (twelve) hours as needed for Pain. 4 tablet 11/12/2022 11/14/2022 FACUNDO Corado    ondansetron (ZOFRAN-ODT) 4 MG TbDL Take 1 tablet (4 mg total) by mouth every 12 (twelve) hours as needed (nausea/vomiting). 4 tablet 11/12/2022 -- FACUNDO Corado          Follow-up Information       Follow up With Specialties Details Why Contact Info    Thompson Rene MD Family Medicine Call in 1 day To discuss ER visit and schedule follow up appointment within 1 week [address] [facility] [id number] [phone]               FACUNDO Corado  11/13/22 0050       Getachew Stewart MD  11/15/22 0637

## 2022-11-13 NOTE — DISCHARGE INSTRUCTIONS
See your primary care in 2 days for wound check.  Sutures can be removed in 7 days.  Referral has been placed to [facility] for follow-up of dog bite wound.  Return to the ER for any change or worsening of symptoms.

## 2022-11-14 ENCOUNTER — PATIENT MESSAGE (OUTPATIENT)
Dept: FAMILY MEDICINE | Facility: CLINIC | Age: 62
End: 2022-11-14
Payer: COMMERCIAL

## 2022-11-28 ENCOUNTER — TELEPHONE (OUTPATIENT)
Dept: EMERGENCY MEDICINE | Facility: HOSPITAL | Age: 62
End: 2022-11-28
Payer: COMMERCIAL

## 2023-06-23 ENCOUNTER — TELEPHONE (OUTPATIENT)
Dept: FAMILY MEDICINE | Facility: CLINIC | Age: 63
End: 2023-06-23
Payer: COMMERCIAL

## 2023-06-26 NOTE — TELEPHONE ENCOUNTER
Pt inform that the labs he done on 10/27 is good for appt on 10/31/23 and dr just wants him to repeat labs in 6 mths due to labs being good on 10/27/22.Pt agrees verbalize and understand.

## 2023-06-26 NOTE — TELEPHONE ENCOUNTER
Labs were done October 27, 2022-that was less than a year ago.  They were very good.  I do not think they need to be repeated at this time.  We can repeat them in six months to one year.

## 2023-08-01 ENCOUNTER — OFFICE VISIT (OUTPATIENT)
Dept: FAMILY MEDICINE | Facility: CLINIC | Age: 63
End: 2023-08-01
Payer: COMMERCIAL

## 2023-08-01 DIAGNOSIS — H65.90 FLUID LEVEL BEHIND TYMPANIC MEMBRANE, UNSPECIFIED LATERALITY: ICD-10-CM

## 2023-08-01 DIAGNOSIS — R07.89 CHEST WALL PAIN: ICD-10-CM

## 2023-08-01 DIAGNOSIS — J30.1 ALLERGIC RHINITIS DUE TO POLLEN, UNSPECIFIED SEASONALITY: Primary | ICD-10-CM

## 2023-08-01 PROCEDURE — 99213 PR OFFICE/OUTPT VISIT, EST, LEVL III, 20-29 MIN: ICD-10-PCS | Mod: 95,,, | Performed by: FAMILY MEDICINE

## 2023-08-01 PROCEDURE — 99213 OFFICE O/P EST LOW 20 MIN: CPT | Mod: 95,,, | Performed by: FAMILY MEDICINE

## 2023-08-01 RX ORDER — PREDNISONE 20 MG/1
TABLET ORAL
Qty: 5 TABLET | Refills: 0 | Status: SHIPPED | OUTPATIENT
Start: 2023-08-01

## 2023-08-01 RX ORDER — CEPHALEXIN 500 MG/1
1000 CAPSULE ORAL EVERY 12 HOURS
Qty: 28 CAPSULE | Refills: 0 | Status: SHIPPED | OUTPATIENT
Start: 2023-08-01 | End: 2023-08-08

## 2023-08-01 NOTE — PROGRESS NOTES
" Patient ID: Morgan Marshall III is a 63 y.o. male.    Chief Complaint: Ear Fullness    HPI       Morgan Marshall III is a 63 y.o. male patient with complaints of nasal congestion and ear fullness.  Has been this way for several days to weeks.  Has not been aggressive with over-the-counter medications  Does disclose that he is had chest pain lasting 2nd or so in the center or in his chest left side your ribs.  It does not cause significant or pain or dyspnea.  If he takes an aspirin each day it goes away.  Does not happen with walking exercise or anything physical.  Does keep himself in good physical shape.      Review of Symptoms    Constitutional  No change in activity, No chills fever   Resp  Neg hemoptysis, stridor, choking  CVS  Neg chest pain, palpitations    There were no vitals taken for this visit.    Physical Exam    There were no vitals filed for this visit.    Constitutional:   Oriented to person, place, and time.appears well-developed and well-nourished.   No distress.        Psychiatric: Normal mood and affect. Behavior is normal. Judgment and thought content normal.     Complete Blood Count  Lab Results   Component Value Date    RBC 4.65 10/27/2022    HGB 15.0 10/27/2022    HCT 44.7 10/27/2022    MCV 96 10/27/2022    MCH 32.3 (H) 10/27/2022    MCHC 33.6 10/27/2022    RDW 11.9 10/27/2022     10/27/2022    MPV 10.3 10/27/2022    GRAN 2.6 10/27/2022    GRAN 53.9 10/27/2022    LYMPH 1.2 10/27/2022    LYMPH 24.4 10/27/2022    MONO 0.6 10/27/2022    MONO 11.6 10/27/2022    EOS 0.4 10/27/2022    BASO 0.10 10/27/2022    EOSINOPHIL 7.6 10/27/2022    BASOPHIL 2.1 (H) 10/27/2022    DIFFMETHOD Automated 10/27/2022       Comprehensive Metabolic Panel  No results found for: "GLU", "BUN", "CREATININE", "NA", "K", "CL", "PROT", "ALBUMIN", "BILITOT", "AST", "ALKPHOS", "CO2", "ALT", "ANIONGAP", "EGFRNONAA", "ESTGFRAFRICA"    TSH  No results found for: "TSH"    Assessment / Plan:      ICD-10-CM ICD-9-CM   1. " Allergic rhinitis due to pollen, unspecified seasonality  J30.1 477.0   2. Fluid level behind tympanic membrane, unspecified laterality  H65.90 381.4   3. Chest wall pain  R07.89 786.52     Allergic rhinitis due to pollen, unspecified seasonality    Fluid level behind tympanic membrane, unspecified laterality    Chest wall pain    Other orders  -     cephALEXin (KEFLEX) 500 MG capsule; Take 2 capsules (1,000 mg total) by mouth every 12 (twelve) hours. for 7 days  Dispense: 28 capsule; Refill: 0  -     predniSONE (DELTASONE) 20 MG tablet; One tablet daily by mouth for five days  Dispense: 5 tablet; Refill: 0        The patient location is:  Work  The chief complaint leading to consultation is:  Allergic rhinitis-ear fullness-chest wall pain  Visit type: Virtual visit with synchronous audio and video  Total time spent with patient:  10 minutes  Each patient to whom he or she provides medical services by telemedicine is:  (1) informed of the relationship between the physician and patient and the respective role of any other health care provider with respect to management of the patient; and (2) notified that he or she may decline to receive medical services by telemedicine and may withdraw from such care at any time.

## 2023-08-09 ENCOUNTER — PATIENT MESSAGE (OUTPATIENT)
Dept: FAMILY MEDICINE | Facility: CLINIC | Age: 63
End: 2023-08-09
Payer: COMMERCIAL

## 2023-08-09 DIAGNOSIS — R05.9 COUGH, UNSPECIFIED TYPE: Primary | ICD-10-CM

## 2023-08-11 ENCOUNTER — HOSPITAL ENCOUNTER (OUTPATIENT)
Dept: RADIOLOGY | Facility: HOSPITAL | Age: 63
Discharge: HOME | End: 2023-08-11
Attending: FAMILY MEDICINE
Payer: COMMERCIAL

## 2023-08-11 ENCOUNTER — TELEPHONE (OUTPATIENT)
Dept: FAMILY MEDICINE | Facility: CLINIC | Age: 63
End: 2023-08-11
Payer: COMMERCIAL

## 2023-08-11 DIAGNOSIS — R05.9 COUGH, UNSPECIFIED TYPE: ICD-10-CM

## 2023-08-11 PROCEDURE — 71046 XR CHEST PA AND LATERAL: ICD-10-PCS | Mod: 26,,, | Performed by: RADIOLOGY

## 2023-08-11 PROCEDURE — 71046 X-RAY EXAM CHEST 2 VIEWS: CPT | Mod: 26,,, | Performed by: RADIOLOGY

## 2023-08-11 PROCEDURE — 71046 X-RAY EXAM CHEST 2 VIEWS: CPT | Mod: TC,FY,PO

## 2023-09-07 DIAGNOSIS — R05.9 COUGH, UNSPECIFIED TYPE: Primary | ICD-10-CM

## 2023-09-07 RX ORDER — PANTOPRAZOLE SODIUM 40 MG/1
40 TABLET, DELAYED RELEASE ORAL DAILY
Qty: 30 TABLET | Refills: 11 | Status: SHIPPED | OUTPATIENT
Start: 2023-09-07 | End: 2024-02-20

## 2023-09-18 ENCOUNTER — OFFICE VISIT (OUTPATIENT)
Dept: OTOLARYNGOLOGY | Facility: CLINIC | Age: 63
End: 2023-09-18
Payer: COMMERCIAL

## 2023-09-18 VITALS
DIASTOLIC BLOOD PRESSURE: 76 MMHG | WEIGHT: 197 LBS | HEART RATE: 54 BPM | BODY MASS INDEX: 28.26 KG/M2 | SYSTOLIC BLOOD PRESSURE: 120 MMHG

## 2023-09-18 DIAGNOSIS — J31.0 CHRONIC RHINITIS: Chronic | ICD-10-CM

## 2023-09-18 DIAGNOSIS — J32.8 OTHER CHRONIC SINUSITIS: Chronic | ICD-10-CM

## 2023-09-18 DIAGNOSIS — R09.A2 GLOBUS SENSATION: ICD-10-CM

## 2023-09-18 DIAGNOSIS — R05.9 COUGH, UNSPECIFIED TYPE: ICD-10-CM

## 2023-09-18 DIAGNOSIS — K21.9 LARYNGOPHARYNGEAL REFLUX (LPR): Primary | Chronic | ICD-10-CM

## 2023-09-18 DIAGNOSIS — R49.0 DYSPHONIA: ICD-10-CM

## 2023-09-18 DIAGNOSIS — J31.0 RHINITIS MEDICAMENTOSA: ICD-10-CM

## 2023-09-18 DIAGNOSIS — H93.13 TINNITUS AURIUM, BILATERAL: Chronic | ICD-10-CM

## 2023-09-18 DIAGNOSIS — T48.5X5A RHINITIS MEDICAMENTOSA: ICD-10-CM

## 2023-09-18 PROCEDURE — 1159F MED LIST DOCD IN RCRD: CPT | Mod: CPTII,S$GLB,, | Performed by: OTOLARYNGOLOGY

## 2023-09-18 PROCEDURE — 3078F DIAST BP <80 MM HG: CPT | Mod: CPTII,S$GLB,, | Performed by: OTOLARYNGOLOGY

## 2023-09-18 PROCEDURE — 3008F BODY MASS INDEX DOCD: CPT | Mod: CPTII,S$GLB,, | Performed by: OTOLARYNGOLOGY

## 2023-09-18 PROCEDURE — 3074F PR MOST RECENT SYSTOLIC BLOOD PRESSURE < 130 MM HG: ICD-10-PCS | Mod: CPTII,S$GLB,, | Performed by: OTOLARYNGOLOGY

## 2023-09-18 PROCEDURE — 99999 PR PBB SHADOW E&M-EST. PATIENT-LVL III: ICD-10-PCS | Mod: PBBFAC,,, | Performed by: OTOLARYNGOLOGY

## 2023-09-18 PROCEDURE — 31575 LARYNGOSCOPY: ICD-10-PCS | Mod: S$GLB,,, | Performed by: OTOLARYNGOLOGY

## 2023-09-18 PROCEDURE — 1160F RVW MEDS BY RX/DR IN RCRD: CPT | Mod: CPTII,S$GLB,, | Performed by: OTOLARYNGOLOGY

## 2023-09-18 PROCEDURE — 99204 OFFICE O/P NEW MOD 45 MIN: CPT | Mod: 25,S$GLB,, | Performed by: OTOLARYNGOLOGY

## 2023-09-18 PROCEDURE — 3074F SYST BP LT 130 MM HG: CPT | Mod: CPTII,S$GLB,, | Performed by: OTOLARYNGOLOGY

## 2023-09-18 PROCEDURE — 1159F PR MEDICATION LIST DOCUMENTED IN MEDICAL RECORD: ICD-10-PCS | Mod: CPTII,S$GLB,, | Performed by: OTOLARYNGOLOGY

## 2023-09-18 PROCEDURE — 31575 DIAGNOSTIC LARYNGOSCOPY: CPT | Mod: S$GLB,,, | Performed by: OTOLARYNGOLOGY

## 2023-09-18 PROCEDURE — 99999 PR PBB SHADOW E&M-EST. PATIENT-LVL III: CPT | Mod: PBBFAC,,, | Performed by: OTOLARYNGOLOGY

## 2023-09-18 PROCEDURE — 3008F PR BODY MASS INDEX (BMI) DOCUMENTED: ICD-10-PCS | Mod: CPTII,S$GLB,, | Performed by: OTOLARYNGOLOGY

## 2023-09-18 PROCEDURE — 99204 PR OFFICE/OUTPT VISIT, NEW, LEVL IV, 45-59 MIN: ICD-10-PCS | Mod: 25,S$GLB,, | Performed by: OTOLARYNGOLOGY

## 2023-09-18 PROCEDURE — 3078F PR MOST RECENT DIASTOLIC BLOOD PRESSURE < 80 MM HG: ICD-10-PCS | Mod: CPTII,S$GLB,, | Performed by: OTOLARYNGOLOGY

## 2023-09-18 PROCEDURE — 1160F PR REVIEW ALL MEDS BY PRESCRIBER/CLIN PHARMACIST DOCUMENTED: ICD-10-PCS | Mod: CPTII,S$GLB,, | Performed by: OTOLARYNGOLOGY

## 2023-09-18 RX ORDER — MOMETASONE FUROATE 50 UG/1
2 SPRAY, METERED NASAL DAILY
Qty: 17 G | Refills: 3 | Status: SHIPPED | OUTPATIENT
Start: 2023-09-18

## 2023-09-18 RX ORDER — AZELASTINE 1 MG/ML
1 SPRAY, METERED NASAL 2 TIMES DAILY
Qty: 30 ML | Refills: 0 | Status: SHIPPED | OUTPATIENT
Start: 2023-09-18 | End: 2023-11-13 | Stop reason: SDUPTHER

## 2023-09-18 NOTE — PROCEDURES
Laryngoscopy    Date/Time: 9/18/2023 8:00 AM    Performed by: Rose Marie Winter MD  Authorized by: Rose Marie Winter MD    Consent Done?:  Yes (Verbal)  Anesthesia:     Local anesthetic:  Lidocaine 2% and Clif-Synephrine 1/2%  Laryngoscopy:     Areas examined:  Nasal cavities, nasopharynx, oropharynx, hypopharynx, larynx and vocal cords  Nose External:      No external nasal deformity  Nose Intranasal:      Mucosa no polyps     Mucosa ulcers not present     No mucosa lesions present     No septum gross deformity     Enlarged turbinates  Nasopharynx:      No mucosa lesions     Adenoids not present     Posterior choanae patent     Eustachian tube patent  Larynx/hypopharynx:      No epiglottis lesions     No epiglottis edema     No AE folds lesions     No vocal cord polyps     Equal and normal bilateral     No hypopharynx lesions     No piriform sinus pooling     No piriform sinus lesions     Post cricoid edema (moderate)     Post cricoid erythema (moderate)

## 2023-09-18 NOTE — PROGRESS NOTES
Chief Complaint   Patient presents with    Cough     Started in June, lasted for about 8 weeks, improved once starting protonix    Heartburn    Nasal Congestion   .     HPI:Morgan Marshall III is a 63 y.o. male who has been referred by Dr. Thompson Ramirez MD for a 3 months history of cough and heartburn.  He admits to hoarseness which has been present for several years. . His voice is not progressively worsening over this time. There are pitch breaks or cracks. There is not vocal fatigue. He denies dysphagia, odynophagia, throat pain, and otalgia.  There is no hemoptysis or hematemesis. He is breathing well.     He admits to throat clearing and cough. He admits to heartburn and reflux. He has been on Protonix 40mg PO daily with some relief of cough.He did have EGD in 2015 with Dr. Joya.      He has been having nasal congestion. History of 2 sinus surgeries in the past- 1 balloon sinuplasty; and ESS in 2018 with Dr. Corral in [place]. He has used Flonase in the past and did not feel that it helped. He has been using nasal decongestant zicam at night a few times per week. He does does use the nasal lavage    He describes bilateral tinnitus as well. He did have audiogram in 2/2022. He reports that he was told he has a slight loss in the right ear.         Past Medical History:   Diagnosis Date    Allergy     Hyperlipidemia     Iron deficiency anemia 2015     Social History     Socioeconomic History    Marital status:    Tobacco Use    Smoking status: Never   Substance and Sexual Activity    Alcohol use: No    Drug use: No     Past Surgical History:   Procedure Laterality Date    COLONOSCOPY  04/01/2015    due 10 yrs, dr bravo    kidney stones      SINUS SURGERY      SINUS SURGERY  12/13/2018    Septoplasty, SMR, Destruction Lesion, Maxillary, Ethmoid, Frontal, Sphenoid, Draf I, IGS    TONSILLECTOMY      WISDOM TOOTH EXTRACTION      WRIST SURGERY       Family History   Problem Relation Age of  Onset    Hyperlipidemia Father     Hyperlipidemia Sister     Hyperlipidemia Brother     Hyperlipidemia Sister            Review of Systems  General: negative for chills, fever or weight loss  Psychological: negative for mood changes or depression  Ophthalmic: negative for blurry vision, photophobia or eye pain  ENT: see HPI  Respiratory: no cough, shortness of breath, or wheezing  Cardiovascular: no chest pain or dyspnea on exertion  Gastrointestinal: no abdominal pain, change in bowel habits, or black/ bloody stools  Musculoskeletal: negative for gait disturbance or muscular weakness  Neurological: no syncope or seizures; no ataxia  Dermatological: negative for puritis,  rash and jaundice  Hematologic/lymphatic: no easy bruising, no new lumps or bumps      Physical Exam:    Vitals:    09/18/23 0754   BP: 120/76   Pulse: (!) 54       Constitutional: Well appearing / communicating without difficutly.  NAD.  Eyes: EOM I Bilaterally  Head/Face: Normocephalic.  Negative paranasal sinus pressure/tenderness.  Salivary glands WNL.  House Brackmann I Bilaterally.    Right Ear: Auricle normal appearance. External Auditory Canal within normal limits no lesions or masses,TM w/o masses/lesions/perforations. TM mobility noted.   Left Ear: Auricle normal appearance. External Auditory Canal within normal limits no lesions or masses,TM w/o masses/lesions/perforations. TM mobility noted.  Nose: No gross nasal septal deviation. Inferior Turbinates 3+ bilaterally. No septal perforation. No masses/lesions. External nasal skin appears normal without masses/lesions.  Oral Cavity: Gingiva/lips within normal limits.  Dentition/gingiva healthy appearing. Mucus membranes moist. Floor of mouth soft, no masses palpated. Oral Tongue mobile. Hard Palate appears normal.    Oropharynx: Base of tongue appears normal. No masses/lesions noted. Tonsillar fossa/pharyngeal wall without lesions. Posterior oropharynx WNL.  Soft palate without masses.  Midline uvula.   Neck/Lymphatic: No LAD I-VI bilaterally.  No thyromegaly.  No masses noted on exam.    Mirror laryngoscopy/nasopharyngoscopy: Active gag reflex.  Unable to perform.        See separate procedure note for FFL.    Diagnostic studies reviewed:       Assessment:    ICD-10-CM ICD-9-CM    1. Laryngopharyngeal reflux (LPR)  K21.9 478.79       2. Globus sensation  R09.89 784.99 Laryngoscopy      3. Cough, unspecified type  R05.9 786.2 Ambulatory referral/consult to ENT      4. Rhinitis medicamentosa  J31.0 472.0     T48.5X5A E945.6       5. Chronic rhinitis  J31.0 472.0       6. Other chronic sinusitis  J32.8 473.8       7. Tinnitus aurium, bilateral  H93.13 388.31       8. Dysphonia  R49.0 784.42 Laryngoscopy        The primary encounter diagnosis was Laryngopharyngeal reflux (LPR). Diagnoses of Globus sensation, Cough, unspecified type, Rhinitis medicamentosa, Chronic rhinitis, Other chronic sinusitis, Tinnitus aurium, bilateral, and Dysphonia were also pertinent to this visit.      Plan:  Orders Placed This Encounter   Procedures    Laryngoscopy       Request records from Dr. Corral and from Dr. Meza and Dr. Connell    I had a long discussion with the patient regarding the diagnosis of rhinitis medicamentosa.  We discussed the physiologic response of the nasal mucosa to the OTC nasal sprays, and that rhinitis medicamentosa is a condition of rebound nasal congestion as a result of extended use of topical decongestants that constrict blood vessels in the lining of the nose.  The patient expressed understand, and is ready to try and stop the use of topical nasal decongestants.  We discussed different options including the use of oral steroids to prevent rebound nasal congestion as well as serially diluting the current bottle of nasal decongestant with saline over the next two weeks to gradually wean from the medicine.    Start nasal saline rinses BID   Start Flonase 2 sprays per nostril daily   Start  astelin 1 spray per nostril BID     I recommended that the patient continue taking  Protonix 40mg PO daily*    I also recommended that the patient refrain from eating within 3 hours of going to bed, to elevate the head of bed very subtly and optimize the impact of gravity on the potential reflux, and to avoid alcohol, caffeine, tobacco, tomato sauce, spicy foods, fried food, and chocolate.     Schedule audiogram to evaluate tinnitus/hearing loss     Follow up in 6-8 weeks to reassess progress with treatment regimen.     Rose Marie Le MD

## 2023-09-18 NOTE — PATIENT INSTRUCTIONS
WHAT IS TINNITUS?  The perception of sound heard in one or both sides of the head. Some refer to it as a ringing, noise, buzzing, roaring, clicking, wooshing, crickets, heartbeat, music, or other noises. There are varying levels of severity. The tinnitus may be constant or periodic. Common complaints include difficulty sleeping, struggling to understand other's speech, depression, and problems focusing.  Tinnitus is a symptom, not a disease. It affects 10-15% of adults in the [place].    HOW IS TINNITUS DIAGNOSED?  A doctor can diagnose tinnitus after a physical examination and interview. The examination may rule out conditions causing the tinnitus such as wax impaction or fluid behind the eardrum. Tinnitus may also be related to hearing loss, especially hearing loss from frequent noise exposure. A hearing test may be performed if you are experiencing tinnitus.    TREATMENT FOR TINNITUS?  Treatment may improve on its own but if it doesn't there are several ways to manage your symptoms although there is no cure. Possible treatment options include amplification (hearing aids), sound therapy (maskers,white noise,etc.), TMJ treatment, cognitive behavioral therapy, relaxation exercises, and managing your medications.  There is not enough evidence to suggest that over the counter products help patients with tinnitus. Acupuncture can neither be recommended or discouraged due to lack of evidence as well.    Source: American Academy of Otolaryngology-Head And Neck Surgery    ORGANIZATIONS AND LINKS FOR TINNITUS AND HEARING LOSS    American Academy of Audiology      www.audiology.org  American Tinnitus Associate        www.solis.org  American Academy of Otolaryngology, Head & Neck Surgery www.entnet.org  American Auditory Society     www.amauditorysoc.org  Better Hearing [place]      www.betterhearing.org  EarHelp        www.earhelp.co.uk/  Hearing Education and Awareness for [place] (HEAR)  www.hearnet.com  Hearing  Loss Association of [place]    www.hearingloss.com   Hear-It        www.hear-it.org  Healthy Hearing       www.[place].Enikos   Sight and Hearing Association     www.sightandhearing.org

## 2023-10-31 ENCOUNTER — OFFICE VISIT (OUTPATIENT)
Dept: FAMILY MEDICINE | Facility: CLINIC | Age: 63
End: 2023-10-31
Payer: COMMERCIAL

## 2023-10-31 VITALS
DIASTOLIC BLOOD PRESSURE: 82 MMHG | HEART RATE: 60 BPM | SYSTOLIC BLOOD PRESSURE: 118 MMHG | OXYGEN SATURATION: 99 % | BODY MASS INDEX: 27.98 KG/M2 | HEIGHT: 70 IN | WEIGHT: 195.44 LBS | TEMPERATURE: 98 F

## 2023-10-31 DIAGNOSIS — Z00.00 ROUTINE HEALTH MAINTENANCE: Primary | ICD-10-CM

## 2023-10-31 DIAGNOSIS — Z12.5 PROSTATE CANCER SCREENING: ICD-10-CM

## 2023-10-31 DIAGNOSIS — Z13.6 SCREENING FOR CARDIOVASCULAR CONDITION: ICD-10-CM

## 2023-10-31 DIAGNOSIS — R73.9 HYPERGLYCEMIA: ICD-10-CM

## 2023-10-31 PROCEDURE — 3079F DIAST BP 80-89 MM HG: CPT | Mod: CPTII,S$GLB,, | Performed by: FAMILY MEDICINE

## 2023-10-31 PROCEDURE — 3074F PR MOST RECENT SYSTOLIC BLOOD PRESSURE < 130 MM HG: ICD-10-PCS | Mod: CPTII,S$GLB,, | Performed by: FAMILY MEDICINE

## 2023-10-31 PROCEDURE — 3074F SYST BP LT 130 MM HG: CPT | Mod: CPTII,S$GLB,, | Performed by: FAMILY MEDICINE

## 2023-10-31 PROCEDURE — 3008F BODY MASS INDEX DOCD: CPT | Mod: CPTII,S$GLB,, | Performed by: FAMILY MEDICINE

## 2023-10-31 PROCEDURE — 1159F PR MEDICATION LIST DOCUMENTED IN MEDICAL RECORD: ICD-10-PCS | Mod: CPTII,S$GLB,, | Performed by: FAMILY MEDICINE

## 2023-10-31 PROCEDURE — 1159F MED LIST DOCD IN RCRD: CPT | Mod: CPTII,S$GLB,, | Performed by: FAMILY MEDICINE

## 2023-10-31 PROCEDURE — 3079F PR MOST RECENT DIASTOLIC BLOOD PRESSURE 80-89 MM HG: ICD-10-PCS | Mod: CPTII,S$GLB,, | Performed by: FAMILY MEDICINE

## 2023-10-31 PROCEDURE — 99396 PR PREVENTIVE VISIT,EST,40-64: ICD-10-PCS | Mod: S$GLB,,, | Performed by: FAMILY MEDICINE

## 2023-10-31 PROCEDURE — 3008F PR BODY MASS INDEX (BMI) DOCUMENTED: ICD-10-PCS | Mod: CPTII,S$GLB,, | Performed by: FAMILY MEDICINE

## 2023-10-31 PROCEDURE — 99396 PREV VISIT EST AGE 40-64: CPT | Mod: S$GLB,,, | Performed by: FAMILY MEDICINE

## 2023-10-31 NOTE — PROGRESS NOTES
" Patient ID: Morgan Marshall III is a 63 y.o. male.    Chief Complaint: Annual Exam    HPI      Morgan Marshall III is a 63 y.o. male. here for annual exam.  Doing very well overall.  No physical complaints.  Cough has cleared with the use of Neti pot-flushing of nasal passages.  Secondly, reflux treated with Protonix.  Reflux probably the primary cause of his cough.  No current complaints at this time.      Review of Symptoms    Constitutional: Negative.    HENT: Negative.    Eyes: Negative.    Respiratory: Negative.    Cardiovascular: Negative.    Gastrointestinal: Negative.    Endocrine: Negative.    Genitourinary: Negative.    Musculoskeletal: Negative.    Skin: Negative.    Allergic/Immunologic: Negative.    Neurological: Negative.    Hematological: Negative.    Psychiatric/Behavioral: Negative.      Except as above in HPI      Vitals:    10/31/23 0850   BP: 118/82   BP Location: Right arm   Patient Position: Sitting   Pulse: 60   Temp: 97.8 °F (36.6 °C)   TempSrc: Oral   SpO2: 99%   Weight: 88.6 kg (195 lb 7 oz)   Height: 5' 10" (1.778 m)        Physical  Exam      Constitutional:  Oriented to person, place, and time. Appears well-developed and well-nourished.     HENT:   Head: Normocephalic and atraumatic.     Right Ear: Tympanic membrane, ear canal and External ear normal     Left Ear: Tympanic membrane, ear canal and External ear normal     Nose: Nose normal. No rhinorrhea or nasal deformity.     Mouth/Throat: Uvula is midline, oropharynx is clear and moist and mucous membranes are normal.      Eyes: Conjunctivae are normal. Right eye exhibits no discharge. Left eye exhibits no discharge. No scleral icterus.     Neck:  No JVD present. No tracheal deviation  [x]  Neck supple.   []  No Carotid bruit    Cardiovascular:  Regular rate and rhythm with normal S1 and S2     Pulmonary/Chest:   Clear to auscultation bilaterally without wheezes, rhonchi or rales    Musculoskeletal: Normal range of motion. No edema or " "tenderness.   No deformity     Lymphadenopathy:  No cervical adenopathy.     Neurological:  Alert and oriented to person, place, and time. Coordination normal.     Skin: Skin is warm and dry. No rash noted.     Psychiatric: Normal mood and affect. Speech is normal and behavior is normal. Judgment and thought content normal.     Complete Blood Count  Lab Results   Component Value Date    RBC 4.65 10/27/2022    HGB 15.0 10/27/2022    HCT 44.7 10/27/2022    MCV 96 10/27/2022    MCH 32.3 (H) 10/27/2022    MCHC 33.6 10/27/2022    RDW 11.9 10/27/2022     10/27/2022    MPV 10.3 10/27/2022    GRAN 2.6 10/27/2022    GRAN 53.9 10/27/2022    LYMPH 1.2 10/27/2022    LYMPH 24.4 10/27/2022    MONO 0.6 10/27/2022    MONO 11.6 10/27/2022    EOS 0.4 10/27/2022    BASO 0.10 10/27/2022    EOSINOPHIL 7.6 10/27/2022    BASOPHIL 2.1 (H) 10/27/2022    DIFFMETHOD Automated 10/27/2022       Comprehensive Metabolic Panel  No results found for: "GLU", "BUN", "CREATININE", "NA", "K", "CL", "PROT", "ALBUMIN", "BILITOT", "AST", "ALKPHOS", "CO2", "ALT", "ANIONGAP", "EGFRNONAA", "ESTGFRAFRICA"    TSH  No results found for: "TSH"    Assessment / Plan:      ICD-10-CM ICD-9-CM   1. Routine health maintenance  Z00.00 V70.0   2. Screening for cardiovascular condition  Z13.6 V81.2   3. Hyperglycemia  R73.9 790.29   4. Prostate cancer screening  Z12.5 V76.44     Routine health maintenance  -     Comprehensive Metabolic Panel; Future  -     CBC Auto Differential; Future  -     Lipid Panel; Future  -     TSH; Future  -     Hemoglobin A1C; Future    Screening for cardiovascular condition  -     Comprehensive Metabolic Panel; Future  -     CBC Auto Differential; Future  -     Lipid Panel; Future  -     TSH; Future  -     Hemoglobin A1C; Future    Hyperglycemia  -     Comprehensive Metabolic Panel; Future  -     CBC Auto Differential; Future  -     Lipid Panel; Future  -     TSH; Future  -     Hemoglobin A1C; Future    Prostate cancer screening  -     " PSA, Screening; Future          Discussed how to stay healthy including: diet, and exercise.

## 2023-11-13 ENCOUNTER — OFFICE VISIT (OUTPATIENT)
Dept: OTOLARYNGOLOGY | Facility: CLINIC | Age: 63
End: 2023-11-13
Payer: COMMERCIAL

## 2023-11-13 ENCOUNTER — CLINICAL SUPPORT (OUTPATIENT)
Dept: OTOLARYNGOLOGY | Facility: CLINIC | Age: 63
End: 2023-11-13
Payer: COMMERCIAL

## 2023-11-13 VITALS
SYSTOLIC BLOOD PRESSURE: 120 MMHG | HEART RATE: 52 BPM | DIASTOLIC BLOOD PRESSURE: 78 MMHG | BODY MASS INDEX: 28.72 KG/M2 | WEIGHT: 200.19 LBS

## 2023-11-13 DIAGNOSIS — H90.42 SENSORINEURAL HEARING LOSS (SNHL) OF LEFT EAR WITH UNRESTRICTED HEARING OF RIGHT EAR: Chronic | ICD-10-CM

## 2023-11-13 DIAGNOSIS — H90.42 SENSORINEURAL HEARING LOSS (SNHL) OF LEFT EAR WITH UNRESTRICTED HEARING OF RIGHT EAR: Primary | ICD-10-CM

## 2023-11-13 DIAGNOSIS — R49.0 DYSPHONIA: ICD-10-CM

## 2023-11-13 DIAGNOSIS — H93.13 TINNITUS OF BOTH EARS: ICD-10-CM

## 2023-11-13 DIAGNOSIS — J31.0 CHRONIC RHINITIS: Chronic | ICD-10-CM

## 2023-11-13 DIAGNOSIS — H93.13 TINNITUS OF BOTH EARS: Chronic | ICD-10-CM

## 2023-11-13 DIAGNOSIS — R09.A2 GLOBUS SENSATION: ICD-10-CM

## 2023-11-13 DIAGNOSIS — K21.9 LARYNGOPHARYNGEAL REFLUX (LPR): Primary | ICD-10-CM

## 2023-11-13 DIAGNOSIS — J32.8 OTHER CHRONIC SINUSITIS: Chronic | ICD-10-CM

## 2023-11-13 PROCEDURE — 31575 DIAGNOSTIC LARYNGOSCOPY: CPT | Mod: S$GLB,,, | Performed by: OTOLARYNGOLOGY

## 2023-11-13 PROCEDURE — 99999 PR PBB SHADOW E&M-EST. PATIENT-LVL I: ICD-10-PCS | Mod: PBBFAC,,, | Performed by: AUDIOLOGIST

## 2023-11-13 PROCEDURE — 3074F PR MOST RECENT SYSTOLIC BLOOD PRESSURE < 130 MM HG: ICD-10-PCS | Mod: CPTII,S$GLB,, | Performed by: OTOLARYNGOLOGY

## 2023-11-13 PROCEDURE — 99999 PR PBB SHADOW E&M-EST. PATIENT-LVL III: CPT | Mod: PBBFAC,,, | Performed by: OTOLARYNGOLOGY

## 2023-11-13 PROCEDURE — 1160F RVW MEDS BY RX/DR IN RCRD: CPT | Mod: CPTII,S$GLB,, | Performed by: OTOLARYNGOLOGY

## 2023-11-13 PROCEDURE — 3078F DIAST BP <80 MM HG: CPT | Mod: CPTII,S$GLB,, | Performed by: OTOLARYNGOLOGY

## 2023-11-13 PROCEDURE — 99214 PR OFFICE/OUTPT VISIT, EST, LEVL IV, 30-39 MIN: ICD-10-PCS | Mod: 25,S$GLB,, | Performed by: OTOLARYNGOLOGY

## 2023-11-13 PROCEDURE — 1159F MED LIST DOCD IN RCRD: CPT | Mod: CPTII,S$GLB,, | Performed by: OTOLARYNGOLOGY

## 2023-11-13 PROCEDURE — 3074F SYST BP LT 130 MM HG: CPT | Mod: CPTII,S$GLB,, | Performed by: OTOLARYNGOLOGY

## 2023-11-13 PROCEDURE — 99214 OFFICE O/P EST MOD 30 MIN: CPT | Mod: 25,S$GLB,, | Performed by: OTOLARYNGOLOGY

## 2023-11-13 PROCEDURE — 31575 LARYNGOSCOPY: ICD-10-PCS | Mod: S$GLB,,, | Performed by: OTOLARYNGOLOGY

## 2023-11-13 PROCEDURE — 3008F BODY MASS INDEX DOCD: CPT | Mod: CPTII,S$GLB,, | Performed by: OTOLARYNGOLOGY

## 2023-11-13 PROCEDURE — 3078F PR MOST RECENT DIASTOLIC BLOOD PRESSURE < 80 MM HG: ICD-10-PCS | Mod: CPTII,S$GLB,, | Performed by: OTOLARYNGOLOGY

## 2023-11-13 PROCEDURE — 92557 PR COMPREHENSIVE HEARING TEST: ICD-10-PCS | Mod: S$GLB,,, | Performed by: AUDIOLOGIST

## 2023-11-13 PROCEDURE — 92557 COMPREHENSIVE HEARING TEST: CPT | Mod: S$GLB,,, | Performed by: AUDIOLOGIST

## 2023-11-13 PROCEDURE — 92567 PR TYMPA2METRY: ICD-10-PCS | Mod: S$GLB,,, | Performed by: AUDIOLOGIST

## 2023-11-13 PROCEDURE — 99999 PR PBB SHADOW E&M-EST. PATIENT-LVL III: ICD-10-PCS | Mod: PBBFAC,,, | Performed by: OTOLARYNGOLOGY

## 2023-11-13 PROCEDURE — 92567 TYMPANOMETRY: CPT | Mod: S$GLB,,, | Performed by: AUDIOLOGIST

## 2023-11-13 PROCEDURE — 99999 PR PBB SHADOW E&M-EST. PATIENT-LVL I: CPT | Mod: PBBFAC,,, | Performed by: AUDIOLOGIST

## 2023-11-13 PROCEDURE — 3008F PR BODY MASS INDEX (BMI) DOCUMENTED: ICD-10-PCS | Mod: CPTII,S$GLB,, | Performed by: OTOLARYNGOLOGY

## 2023-11-13 PROCEDURE — 1160F PR REVIEW ALL MEDS BY PRESCRIBER/CLIN PHARMACIST DOCUMENTED: ICD-10-PCS | Mod: CPTII,S$GLB,, | Performed by: OTOLARYNGOLOGY

## 2023-11-13 PROCEDURE — 1159F PR MEDICATION LIST DOCUMENTED IN MEDICAL RECORD: ICD-10-PCS | Mod: CPTII,S$GLB,, | Performed by: OTOLARYNGOLOGY

## 2023-11-13 RX ORDER — AZELASTINE 1 MG/ML
1 SPRAY, METERED NASAL 2 TIMES DAILY
Qty: 30 ML | Refills: 0 | Status: SHIPPED | OUTPATIENT
Start: 2023-11-13 | End: 2024-11-12

## 2023-11-13 NOTE — PROCEDURES
Laryngoscopy    Date/Time: 11/13/2023 2:20 PM    Performed by: Rose Marie Winter MD  Authorized by: Rose Marie Winter MD    Anesthesia:     Local anesthetic:  Lidocaine 2% and Clif-Synephrine 1/2%  Laryngoscopy:     Areas examined:  Nasal cavities, nasopharynx, oropharynx, hypopharynx, larynx and vocal cords  Nose External:      No external nasal deformity  Nose Intranasal:      Mucosa no polyps     Mucosa ulcers not present     No mucosa lesions present     No septum gross deformity     Turbinates not enlarged  Nasopharynx:      No mucosa lesions     Adenoids not present     Posterior choanae patent     Eustachian tube patent  Larynx/hypopharynx:      No epiglottis lesions     No epiglottis edema     No AE folds lesions     No vocal cord polyps     Equal and normal bilateral     No hypopharynx lesions     No piriform sinus pooling     No piriform sinus lesions     Post cricoid edema (mild)     Post cricoid erythema (mild)

## 2023-11-13 NOTE — PROGRESS NOTES
Chief Complaint   Patient presents with    Follow-up     Improvement    .     HPI:Morgan Marshall III is a 63 y.o. male who has been referred by Dr. Thompson Ramirez MD for a 3 months history of cough and heartburn.  He admits to hoarseness which has been present for several years. . His voice is not progressively worsening over this time. There are pitch breaks or cracks. There is not vocal fatigue. He denies dysphagia, odynophagia, throat pain, and otalgia.  There is no hemoptysis or hematemesis. He is breathing well.     He admits to throat clearing and cough. He admits to heartburn and reflux. He has been on Protonix 40mg PO daily with some relief of cough.He did have EGD in 2015 with Dr. Joya.      He has been having nasal congestion. History of 2 sinus surgeries in the past- 1 balloon sinuplasty; and ESS in 2018 with Dr. Corral in [place]. He has used Flonase in the past and did not feel that it helped. He has been using nasal decongestant zicam at night a few times per week. He does does use the nasal lavage    He describes bilateral tinnitus as well. He did have audiogram in 2/2022. He reports that he was told he has a slight loss in the right ear.       Interval HPI 11/13/2023:  Follow up visit. Reports that he  has had some improvement in his symptoms since last visit. He has been on nasal saline as prescribed at last visit via navage and feels that this has been helpful.   He has been able to stop zicam decongestant spray.  He has been on pantoparazole as prescribed as well. Notes still some globus sensation. Voice seems to be closer to baseline but still somewhat raspy. Feels heartburn is better. He is no longer coughing.        Past Medical History:   Diagnosis Date    Allergy     Hyperlipidemia     Iron deficiency anemia 2015     Social History     Socioeconomic History    Marital status:    Tobacco Use    Smoking status: Never     Passive exposure: Never    Smokeless tobacco:  Never   Substance and Sexual Activity    Alcohol use: No    Drug use: No     Past Surgical History:   Procedure Laterality Date    COLONOSCOPY  04/01/2015    due 10 yrs, dr bravo    kidney stones      SINUS SURGERY      SINUS SURGERY  12/13/2018    Septoplasty, SMR, Destruction Lesion, Maxillary, Ethmoid, Frontal, Sphenoid, Draf I, IGS    TONSILLECTOMY      WISDOM TOOTH EXTRACTION      WRIST SURGERY       Family History   Problem Relation Age of Onset    Hyperlipidemia Father     Hyperlipidemia Sister     Hyperlipidemia Brother     Hyperlipidemia Sister            Review of Systems  General: negative for chills, fever or weight loss  Psychological: negative for mood changes or depression  Ophthalmic: negative for blurry vision, photophobia or eye pain  ENT: see HPI  Respiratory: no cough, shortness of breath, or wheezing  Cardiovascular: no chest pain or dyspnea on exertion  Gastrointestinal: no abdominal pain, change in bowel habits, or black/ bloody stools  Musculoskeletal: negative for gait disturbance or muscular weakness  Neurological: no syncope or seizures; no ataxia  Dermatological: negative for puritis,  rash and jaundice  Hematologic/lymphatic: no easy bruising, no new lumps or bumps      Physical Exam:    Vitals:    11/13/23 1416   BP: 120/78   Pulse: (!) 52         Constitutional: Well appearing / communicating without difficutly.  NAD.  Eyes: EOM I Bilaterally  Head/Face: Normocephalic.  Negative paranasal sinus pressure/tenderness.  Salivary glands WNL.  House Brackmann I Bilaterally.    Right Ear: Auricle normal appearance. External Auditory Canal within normal limits no lesions or masses,TM w/o masses/lesions/perforations. TM mobility noted.   Left Ear: Auricle normal appearance. External Auditory Canal within normal limits no lesions or masses,TM w/o masses/lesions/perforations. TM mobility noted.  Nose: No gross nasal septal deviation. Inferior Turbinates 3+ bilaterally. No septal perforation.  No masses/lesions. External nasal skin appears normal without masses/lesions.  Oral Cavity: Gingiva/lips within normal limits.  Dentition/gingiva healthy appearing. Mucus membranes moist. Floor of mouth soft, no masses palpated. Oral Tongue mobile. Hard Palate appears normal.    Oropharynx: Base of tongue appears normal. No masses/lesions noted. Tonsillar fossa/pharyngeal wall without lesions. Posterior oropharynx WNL.  Soft palate without masses. Midline uvula.   Neck/Lymphatic: No LAD I-VI bilaterally.  No thyromegaly.  No masses noted on exam.    Mirror laryngoscopy/nasopharyngoscopy: Active gag reflex.  Unable to perform.        See separate procedure note for FFL.    Diagnostic studies reviewed:      Audiogram interpreted personally by me and discussed in detail with the patient today. Tympanometry revealed a Type A tympanogram for the left ear and a Type Ad tympanogram for the right ear.  Audiogram results revealed hearing within normal limits for the right ear and a mild high frequency sensorineural hearing loss at 4000Hz only and hearing within normal limits at all other frequencies for the left ear.  Speech reception thresholds were obtained at 5dB for both ears and speech discrimination scores were 100% for both ears.          Assessment:    ICD-10-CM ICD-9-CM    1. Laryngopharyngeal reflux (LPR)  K21.9 478.79       2. Globus sensation  R09.A2 784.99 Laryngoscopy      3. Chronic rhinitis  J31.0 472.0       4. Other chronic sinusitis  J32.8 473.8       5. Sensorineural hearing loss (SNHL) of left ear with unrestricted hearing of right ear  H90.42 389.15       6. Tinnitus of both ears  H93.13 388.30       7. Dysphonia  R49.0 784.42 Laryngoscopy          The primary encounter diagnosis was Laryngopharyngeal reflux (LPR). Diagnoses of Globus sensation, Chronic rhinitis, Other chronic sinusitis, Sensorineural hearing loss (SNHL) of left ear with unrestricted hearing of right ear, Tinnitus of both ears, and  Dysphonia were also pertinent to this visit.      Plan:  Orders Placed This Encounter   Procedures    Laryngoscopy       Continue nasal saline rinses BID   Continue Flonase 2 sprays per nostril daily   Continue Astelin 1 spray per nostril BID    Continue taking  Protonix 40mg PO daily*   Continue  refrain from eating within 3 hours of going to bed, to elevate the head of bed very subtly and optimize the impact of gravity on the potential reflux, and to avoid alcohol, caffeine, tobacco, tomato sauce, spicy foods, fried food, and chocolate.     Audiogram discussed with patient. [place] management strategies revisited. Discussed tinnitus evaluation at [facility] with audiology. He will let us know if he wishes to pursue this.     Follow up in 3 months to reassess progress with treatment regimen.     Rose Marie Le MD

## 2023-11-13 NOTE — PROGRESS NOTES
Morgan Marshall III was seen today in the clinic for an audiologic evaluation.  His main complaint was constant bilateral tinnitus occurring for several years.  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    Tympanometry revealed a Type A tympanogram for the left ear and a Type Ad tympanogram for the right ear.  Audiogram results revealed hearing within normal limits for the right ear and a mild high frequency sensorineural hearing loss at 4000Hz only and hearing within normal limits at all other frequencies for the left ear.  Speech reception thresholds were obtained at 5dB for both ears and speech discrimination scores were 100% for both ears.    Recommendations:  Otologic evaluation  Annual evaluation  Hearing protection in noise

## 2024-02-02 ENCOUNTER — PATIENT MESSAGE (OUTPATIENT)
Dept: OTOLARYNGOLOGY | Facility: CLINIC | Age: 64
End: 2024-02-02
Payer: COMMERCIAL

## 2024-02-19 NOTE — PROGRESS NOTES
Chief Complaint   Patient presents with    Follow-up     Lots Of Improvement    .     HPI:Morgan Marshall III is a 63 y.o. male who has been referred by Dr. Thompson Ramirez MD for a 3 months history of cough and heartburn.  He admits to hoarseness which has been present for several years. . His voice is not progressively worsening over this time. There are pitch breaks or cracks. There is not vocal fatigue. He denies dysphagia, odynophagia, throat pain, and otalgia.  There is no hemoptysis or hematemesis. He is breathing well.     He admits to throat clearing and cough. He admits to heartburn and reflux. He has been on Protonix 40mg PO daily with some relief of cough.He did have EGD in 2015 with Dr. Joya.      He has been having nasal congestion. History of 2 sinus surgeries in the past- 1 balloon sinuplasty; and ESS in 2018 with Dr. Corral in [place]. He has used Flonase in the past and did not feel that it helped. He has been using nasal decongestant zicam at night a few times per week. He does does use the nasal lavage    He describes bilateral tinnitus as well. He did have audiogram in 2/2022. He reports that he was told he has a slight loss in the right ear.       Interval HPI 11/13/2023:  Follow up visit. Reports that he  has had some improvement in his symptoms since last visit. He has been on nasal saline as prescribed at last visit via navage and feels that this has been helpful.   He has been able to stop zicam decongestant spray.  He has been on pantoparazole as prescribed as well. Notes still some globus sensation. Voice seems to be closer to baseline but still somewhat raspy. Feels heartburn is better. He is no longer coughing.        Interval HPI 2/20/2024:  Follow up visit. He has been on nasal saline rinses(navage), astelin/nasonex as prescribed. He does feel this helps his nasal symptoms. He has been taking pantoprazole 40mg PO daily. Heartburn in better controlled. Cough is seldom.  Still has some globus sensation and raspy quality to voice. He does note that he has been snoring more.  He notes that he has some daytime somnolence and will fall asleep is sitting idle. Snoring is worse when supine. He does feel that he has restless sleep pattern and tosses and turns nightly. He does feel rested when he wakes in the mornings. He is concerned about sleep apnea.       Past Medical History:   Diagnosis Date    Allergy     Hyperlipidemia     Iron deficiency anemia 2015     Social History     Socioeconomic History    Marital status:    Tobacco Use    Smoking status: Never     Passive exposure: Never    Smokeless tobacco: Never   Substance and Sexual Activity    Alcohol use: No    Drug use: No     Past Surgical History:   Procedure Laterality Date    COLONOSCOPY  04/01/2015    due 10 yrs, dr bravo    kidney stones      SINUS SURGERY      SINUS SURGERY  12/13/2018    Septoplasty, SMR, Destruction Lesion, Maxillary, Ethmoid, Frontal, Sphenoid, Draf I, IGS    TONSILLECTOMY      WISDOM TOOTH EXTRACTION      WRIST SURGERY       Family History   Problem Relation Age of Onset    Hyperlipidemia Father     Hyperlipidemia Sister     Hyperlipidemia Brother     Hyperlipidemia Sister            Review of Systems  General: negative for chills, fever or weight loss  Psychological: negative for mood changes or depression  Ophthalmic: negative for blurry vision, photophobia or eye pain  ENT: see HPI  Respiratory: no cough, shortness of breath, or wheezing  Cardiovascular: no chest pain or dyspnea on exertion  Gastrointestinal: no abdominal pain, change in bowel habits, or black/ bloody stools  Musculoskeletal: negative for gait disturbance or muscular weakness  Neurological: no syncope or seizures; no ataxia  Dermatological: negative for puritis,  rash and jaundice  Hematologic/lymphatic: no easy bruising, no new lumps or bumps      Physical Exam:    Vitals:    02/20/24 1016   BP: 121/81   Pulse: (!) 58            Constitutional: Well appearing / communicating without difficutly.  NAD.  Eyes: EOM I Bilaterally  Head/Face: Normocephalic.  Negative paranasal sinus pressure/tenderness.  Salivary glands WNL.  House Brackmann I Bilaterally.    Right Ear: Auricle normal appearance. External Auditory Canal within normal limits no lesions or masses,TM w/o masses/lesions/perforations. TM mobility noted.   Left Ear: Auricle normal appearance. External Auditory Canal within normal limits no lesions or masses,TM w/o masses/lesions/perforations. TM mobility noted.  Nose: No gross nasal septal deviation. Inferior Turbinates 3+ bilaterally. No septal perforation. No masses/lesions. External nasal skin appears normal without masses/lesions.  Oral Cavity: Gingiva/lips within normal limits.  Dentition/gingiva healthy appearing. Mucus membranes moist. Floor of mouth soft, no masses palpated. Oral Tongue mobile. Hard Palate appears normal.    Oropharynx: Base of tongue appears normal. No masses/lesions noted. Tonsillar fossa/pharyngeal wall without lesions. Posterior oropharynx WNL.  Soft palate without masses. Midline uvula.   Neck/Lymphatic: No LAD I-VI bilaterally.  No thyromegaly.  No masses noted on exam.    Mirror laryngoscopy/nasopharyngoscopy: Active gag reflex.  Unable to perform.        See separate procedure note for FFL.    Diagnostic studies reviewed:      Audiogram interpreted personally by me and discussed in detail with the patient today. Tympanometry revealed a Type A tympanogram for the left ear and a Type Ad tympanogram for the right ear.  Audiogram results revealed hearing within normal limits for the right ear and a mild high frequency sensorineural hearing loss at 4000Hz only and hearing within normal limits at all other frequencies for the left ear.  Speech reception thresholds were obtained at 5dB for both ears and speech discrimination scores were 100% for both ears.          Assessment:    ICD-10-CM ICD-9-CM     1. Laryngopharyngeal reflux (LPR)  K21.9 478.79       2. Globus sensation  R09.A2 784.99       3. Chronic rhinitis  J31.0 472.0           The primary encounter diagnosis was Laryngopharyngeal reflux (LPR). Diagnoses of Globus sensation and Chronic rhinitis were also pertinent to this visit.      Plan:  No orders of the defined types were placed in this encounter.      Continue nasal saline rinses BID   Continue Flonase 2 sprays per nostril daily   Continue Astelin 1 spray per nostril BID    Discontinue  Protonix 40mg PO daily*. Transition to famotidine 40mg PO nightly. Continue  refrain from eating within 3 hours of going to bed, to elevate the head of bed very subtly and optimize the impact of gravity on the potential reflux, and to avoid alcohol, caffeine, tobacco, tomato sauce, spicy foods, fried food, and chocolate.     Audiogram due 11/24     Refer to sleep medicine clinic for concern of TRACY and sleep study.     Follow up in 4-6 months to reassess progress with treatment regimen.     Rose Marie Le MD

## 2024-02-20 ENCOUNTER — OFFICE VISIT (OUTPATIENT)
Dept: OTOLARYNGOLOGY | Facility: CLINIC | Age: 64
End: 2024-02-20
Payer: COMMERCIAL

## 2024-02-20 ENCOUNTER — OFFICE VISIT (OUTPATIENT)
Dept: SLEEP MEDICINE | Facility: CLINIC | Age: 64
End: 2024-02-20
Payer: COMMERCIAL

## 2024-02-20 VITALS
SYSTOLIC BLOOD PRESSURE: 136 MMHG | HEIGHT: 70 IN | DIASTOLIC BLOOD PRESSURE: 76 MMHG | HEART RATE: 58 BPM | BODY MASS INDEX: 28.21 KG/M2 | WEIGHT: 197.06 LBS | OXYGEN SATURATION: 97 %

## 2024-02-20 VITALS
DIASTOLIC BLOOD PRESSURE: 81 MMHG | HEART RATE: 58 BPM | BODY MASS INDEX: 28.12 KG/M2 | SYSTOLIC BLOOD PRESSURE: 121 MMHG | WEIGHT: 196 LBS

## 2024-02-20 DIAGNOSIS — R09.A2 GLOBUS SENSATION: ICD-10-CM

## 2024-02-20 DIAGNOSIS — R29.818 SUSPECTED SLEEP APNEA: ICD-10-CM

## 2024-02-20 DIAGNOSIS — G47.19 EXCESSIVE DAYTIME SLEEPINESS: Primary | ICD-10-CM

## 2024-02-20 DIAGNOSIS — R06.83 SNORING: ICD-10-CM

## 2024-02-20 DIAGNOSIS — K21.9 GASTROESOPHAGEAL REFLUX DISEASE, UNSPECIFIED WHETHER ESOPHAGITIS PRESENT: ICD-10-CM

## 2024-02-20 DIAGNOSIS — J31.0 CHRONIC RHINITIS: ICD-10-CM

## 2024-02-20 DIAGNOSIS — K21.9 LARYNGOPHARYNGEAL REFLUX (LPR): Primary | ICD-10-CM

## 2024-02-20 PROCEDURE — 99204 OFFICE O/P NEW MOD 45 MIN: CPT | Mod: S$GLB,,, | Performed by: INTERNAL MEDICINE

## 2024-02-20 PROCEDURE — 99214 OFFICE O/P EST MOD 30 MIN: CPT | Mod: 25,S$GLB,, | Performed by: OTOLARYNGOLOGY

## 2024-02-20 PROCEDURE — 3074F SYST BP LT 130 MM HG: CPT | Mod: CPTII,S$GLB,, | Performed by: OTOLARYNGOLOGY

## 2024-02-20 PROCEDURE — 1160F RVW MEDS BY RX/DR IN RCRD: CPT | Mod: CPTII,S$GLB,, | Performed by: OTOLARYNGOLOGY

## 2024-02-20 PROCEDURE — 3078F DIAST BP <80 MM HG: CPT | Mod: CPTII,S$GLB,, | Performed by: INTERNAL MEDICINE

## 2024-02-20 PROCEDURE — 1159F MED LIST DOCD IN RCRD: CPT | Mod: CPTII,S$GLB,, | Performed by: OTOLARYNGOLOGY

## 2024-02-20 PROCEDURE — 99999 PR PBB SHADOW E&M-EST. PATIENT-LVL III: CPT | Mod: PBBFAC,,, | Performed by: OTOLARYNGOLOGY

## 2024-02-20 PROCEDURE — 3008F BODY MASS INDEX DOCD: CPT | Mod: CPTII,S$GLB,, | Performed by: INTERNAL MEDICINE

## 2024-02-20 PROCEDURE — 1159F MED LIST DOCD IN RCRD: CPT | Mod: CPTII,S$GLB,, | Performed by: INTERNAL MEDICINE

## 2024-02-20 PROCEDURE — 3075F SYST BP GE 130 - 139MM HG: CPT | Mod: CPTII,S$GLB,, | Performed by: INTERNAL MEDICINE

## 2024-02-20 PROCEDURE — 1160F RVW MEDS BY RX/DR IN RCRD: CPT | Mod: CPTII,S$GLB,, | Performed by: INTERNAL MEDICINE

## 2024-02-20 PROCEDURE — 3079F DIAST BP 80-89 MM HG: CPT | Mod: CPTII,S$GLB,, | Performed by: OTOLARYNGOLOGY

## 2024-02-20 PROCEDURE — 31575 DIAGNOSTIC LARYNGOSCOPY: CPT | Mod: S$GLB,,, | Performed by: OTOLARYNGOLOGY

## 2024-02-20 PROCEDURE — 3008F BODY MASS INDEX DOCD: CPT | Mod: CPTII,S$GLB,, | Performed by: OTOLARYNGOLOGY

## 2024-02-20 PROCEDURE — 99999 PR PBB SHADOW E&M-EST. PATIENT-LVL IV: CPT | Mod: PBBFAC,,, | Performed by: INTERNAL MEDICINE

## 2024-02-20 RX ORDER — FAMOTIDINE 40 MG/1
40 TABLET, FILM COATED ORAL DAILY
Qty: 30 TABLET | Refills: 11 | Status: SHIPPED | OUTPATIENT
Start: 2024-02-20 | End: 2025-02-19

## 2024-02-20 NOTE — PROCEDURES
Laryngoscopy    Date/Time: 2/20/2024 10:20 AM    Performed by: Rose Marie Winter MD  Authorized by: Rose Marie Winter MD    Consent Done?:  Yes (Verbal)  Anesthesia:     Local anesthetic:  Lidocaine 2% and Clif-Synephrine 1/2%  Laryngoscopy:     Areas examined:  Nasal cavities, nasopharynx, oropharynx, hypopharynx, larynx and vocal cords  Nose External:      No external nasal deformity  Nose Intranasal:      Mucosa no polyps     Mucosa ulcers not present     No mucosa lesions present     No septum gross deformity     Turbinates not enlarged  Nasopharynx:      No mucosa lesions     Adenoids not present     Posterior choanae patent     Eustachian tube patent  Larynx/hypopharynx:      No epiglottis lesions     No epiglottis edema     No AE folds lesions     No vocal cord polyps     Equal and normal bilateral     No hypopharynx lesions     No piriform sinus pooling     No piriform sinus lesions     Post cricoid edema (mild)     Post cricoid erythema (mild)

## 2024-02-20 NOTE — PROGRESS NOTES
Subjective:   Patient ID: Morgan Marshall III is a 63 y.o. male    Chief Complaint:   Chief Complaint   Patient presents with    Snoring       Referred by Dr. Rose Marie Valdovinos*    HPI  Morgan Marshall III is a 63 y.o. male who was referred by Dr. Rose Marie Valdovinos* for a sleep evaluation for sleep disordered breathing The patient  has a past medical history of Allergy, Hyperlipidemia, and Iron deficiency anemia (2015).       The patient reports diurnal fatigue.  The patient reports witnessed snoring noticed by his wife.  There is no witnessed apneas. He has  awoken from a snore and has gasping for air.   Snoring is usually of loud intensity.  Additionally, her has GERD symptoms, which are improving with watching his diet.  He sleeps with a wedge pillow that elevates the head of his bed.    When he wakes up from sleep, he does not feel un refreshed.     There is also reported dry mouth.    Patient does drink  caffeinated beverages daily. 6 cups in the mornings.    Weight: Patient reports no change in weight over the last year.      Symptoms of restless legs syndrome are not reported.      Prior sleep evaluation: none    Sleep medication taken: none.    Other sleep remedies: none.    Sleep summary during the workdays per patient report:  Bedtime: 9 PM  Sleep Latency: 1 min  # Awakenings: 1-2  WASO (wakefulness after sleep onset) a few min  Risetime: 5 AM    Sleep summary during days off per patient report:  As above    Naps are taken frequently.        CONTRIBUTING and/or CONFOUNDING FACTORS:    Nocturnal pain:   n    Nocturia >2/night:   n  Heartburn/GI pain:   n  Environment:    n  Bed partner noise/movements : n      Patient provided ESS:    [place] Sleepiness Scale TOTAL   (validated sleepiness questionnaire with a higher score indicating greater sleepiness; range 0-24)      2/20/2024     2:52 PM   EPWORTH SLEEPINESS SCALE   Sitting and reading 3   Watching TV 3   Sitting, inactive in a public place (e.g. a  theatre or a meeting) 3   As a passenger in a car for an hour without a break 1   Lying down to rest in the afternoon when circumstances permit 3   Sitting and talking to someone 0   Sitting quietly after a lunch without alcohol 2   In a car, while stopped for a few minutes in traffic 0   Total score 15         STOP BANG questionnaire:    Snoring present: y  Tiredness present:y  Obstruction (apneas/choking episodes): n  Pressure (HTN): n    BMI greater than 35 kg/m2: n  Age greater than 50 years old: y  Neck circumference > than 17 inches if male or > than 16 inches if female : y  Gender being male: y    Total STOP BANG score = 5/8  Low risk TRACY: 0-2, Intermediate risk TRACY: 3-4, High risk TRACY: 5+         No data to display                   No data to display                Most Recent Vital Signs:    The patient's body mass index is 28.28 kg/m².    Wt Readings from Last 5 Encounters:   02/20/24 89.4 kg (197 lb 1.5 oz)   02/20/24 88.9 kg (195 lb 15.8 oz)   11/13/23 90.8 kg (200 lb 2.8 oz)   10/31/23 88.6 kg (195 lb 7 oz)   09/18/23 89.4 kg (196 lb 15.7 oz)     BMI Readings from Last 5 Encounters:   02/20/24 28.28 kg/m²   02/20/24 28.12 kg/m²   11/13/23 28.72 kg/m²   10/31/23 28.04 kg/m²   09/18/23 28.26 kg/m²     Pulse Readings from Last 3 Encounters:   02/20/24 (!) 58   02/20/24 (!) 58   11/13/23 (!) 52         Current Outpatient Medications:     atorvastatin (LIPITOR) 40 MG tablet, TAKE 1 TABLET BY MOUTH EVERY DAY -- DISCONTINUE PRAVASTATIN, Disp: 90 tablet, Rfl: 3    azelastine (ASTELIN) 137 mcg (0.1 %) nasal spray, 1 spray (137 mcg total) by Nasal route 2 (two) times daily., Disp: 30 mL, Rfl: 0    famotidine (PEPCID) 40 MG tablet, Take 1 tablet (40 mg total) by mouth once daily., Disp: 30 tablet, Rfl: 11    mometasone (NASONEX) 50 mcg/actuation nasal spray, 2 sprays by Nasal route once daily. (Patient not taking: Reported on 10/31/2023), Disp: 17 g, Rfl: 3    ondansetron (ZOFRAN-ODT) 4 MG TbDL, Take 1 tablet (4  mg total) by mouth every 12 (twelve) hours as needed (nausea/vomiting)., Disp: 4 tablet, Rfl: 0    predniSONE (DELTASONE) 20 MG tablet, One tablet daily by mouth for five days, Disp: 5 tablet, Rfl: 0         Objective:   Vitals reviewed   Physical Exam  Constitutional:       Appearance: Normal appearance.   HENT:      Head: Normocephalic.      Mouth/Throat:      Comments: Mallampati IV, large tongue  Cardiovascular:      Rate and Rhythm: Normal rate and regular rhythm.   Pulmonary:      Effort: Pulmonary effort is normal. No respiratory distress.      Breath sounds: No wheezing or rales.   Musculoskeletal:      Cervical back: Normal range of motion and neck supple.   Neurological:      General: No focal deficit present.      Mental Status: He is alert and oriented to person, place, and time.   Psychiatric:         Mood and Affect: Mood normal.         Behavior: Behavior normal.         Assessment:     Problem List Items Addressed This Visit    None  Visit Diagnoses       Excessive daytime sleepiness    -  Primary    Relevant Orders    Home Sleep Study    Snoring        Relevant Orders    Home Sleep Study    Suspected sleep apnea        Relevant Orders    Home Sleep Study    Gastroesophageal reflux disease, unspecified whether esophagitis present        Relevant Orders    Home Sleep Study              Plan:      Could likely be secondary to sleep disordered breathing of obstructive origin.  Reviewed STOP BANG and ESS.    Patient has been informed about the pathophysiology and prognosis associated with TRACY and CSA and has been advised to undergo a baseline Polysomnography (PSG) study for further evaluation of his sleep disorder breathing.    Patient understood and agreed to undergo for a PSG study. Will order Home Sleep Study and proceed with further studies or prescription for CPAP as appropriate.    Patient has been advised to loose weight through a diet and exercise plan.    Today's office encounter included review  of salient clinical notes from others involved in the care of the patient, discrete laboratory elements and, as available, prior and present intervention for the disturbance of sleep.  The information gleaned was used in establishing the diagnosis and in stratification of disease risk.    The patient has a disturbance of sleep with clinically relevant potential for adverse behavioral, cardiovascular, and metabolic outcomes.  Untreated, the disturbance of sleep can have significant effect on mental health, clinical health and survival.       Additionally close attention to patient's past medical history as described below were discussed today during the office evaluation.   he  has a past medical history of Allergy, Hyperlipidemia, and Iron deficiency anemia (2015).       All patient's questions and concerns regarding Sleep Disorder Breathing were addressed during this visit.    Will follow-up patient with results of PSG

## 2024-02-22 ENCOUNTER — TELEPHONE (OUTPATIENT)
Dept: SLEEP MEDICINE | Facility: OTHER | Age: 64
End: 2024-02-22
Payer: COMMERCIAL

## 2024-03-07 ENCOUNTER — HOSPITAL ENCOUNTER (OUTPATIENT)
Dept: SLEEP MEDICINE | Facility: HOSPITAL | Age: 64
Discharge: HOME | End: 2024-03-07
Attending: INTERNAL MEDICINE
Payer: COMMERCIAL

## 2024-03-07 DIAGNOSIS — R29.818 SUSPECTED SLEEP APNEA: ICD-10-CM

## 2024-03-07 DIAGNOSIS — R06.83 SNORING: ICD-10-CM

## 2024-03-07 DIAGNOSIS — K21.9 GASTROESOPHAGEAL REFLUX DISEASE, UNSPECIFIED WHETHER ESOPHAGITIS PRESENT: ICD-10-CM

## 2024-03-07 DIAGNOSIS — G47.19 EXCESSIVE DAYTIME SLEEPINESS: ICD-10-CM

## 2024-03-24 ENCOUNTER — PATIENT MESSAGE (OUTPATIENT)
Dept: SLEEP MEDICINE | Facility: CLINIC | Age: 64
End: 2024-03-24
Payer: COMMERCIAL

## 2024-04-02 ENCOUNTER — TELEPHONE (OUTPATIENT)
Dept: SLEEP MEDICINE | Facility: OTHER | Age: 64
End: 2024-04-02
Payer: COMMERCIAL

## 2024-04-03 ENCOUNTER — HOSPITAL ENCOUNTER (OUTPATIENT)
Dept: SLEEP MEDICINE | Facility: OTHER | Age: 64
Discharge: HOME | End: 2024-04-03
Attending: INTERNAL MEDICINE
Payer: COMMERCIAL

## 2024-04-03 DIAGNOSIS — G47.33 OSA (OBSTRUCTIVE SLEEP APNEA): Primary | ICD-10-CM

## 2024-04-03 PROCEDURE — 95800 SLP STDY UNATTENDED: CPT

## 2024-04-17 ENCOUNTER — PATIENT MESSAGE (OUTPATIENT)
Dept: SLEEP MEDICINE | Facility: CLINIC | Age: 64
End: 2024-04-17
Payer: COMMERCIAL

## 2024-04-25 ENCOUNTER — PATIENT MESSAGE (OUTPATIENT)
Dept: SLEEP MEDICINE | Facility: CLINIC | Age: 64
End: 2024-04-25
Payer: COMMERCIAL

## 2024-04-25 DIAGNOSIS — G47.33 OSA (OBSTRUCTIVE SLEEP APNEA): Primary | ICD-10-CM

## 2024-04-25 PROCEDURE — 95806 SLEEP STUDY UNATT&RESP EFFT: CPT | Mod: 26,,, | Performed by: INTERNAL MEDICINE

## 2024-05-02 ENCOUNTER — CLINICAL SUPPORT (OUTPATIENT)
Dept: FAMILY MEDICINE | Facility: CLINIC | Age: 64
End: 2024-05-02
Payer: COMMERCIAL

## 2024-05-02 DIAGNOSIS — Z23 NEED FOR SHINGLES VACCINE: Primary | ICD-10-CM

## 2024-05-02 DIAGNOSIS — R41.3 MEMORY LOSS: Primary | ICD-10-CM

## 2024-05-02 PROCEDURE — 90750 HZV VACC RECOMBINANT IM: CPT | Mod: S$GLB,,, | Performed by: STUDENT IN AN ORGANIZED HEALTH CARE EDUCATION/TRAINING PROGRAM

## 2024-05-02 PROCEDURE — 90471 IMMUNIZATION ADMIN: CPT | Mod: S$GLB,,, | Performed by: STUDENT IN AN ORGANIZED HEALTH CARE EDUCATION/TRAINING PROGRAM

## 2024-06-11 NOTE — PROGRESS NOTES
NEUROPSYCHOLOGY CONSULT (TELEHEALTH)  Referral Information  Name: Morgan Marshall III  MRN: [id number]  : 1960  Age: 63 y.o.  Race: White  Gender: male  REFERRAL SOURCE: Avel Soni MD  DATE CONDUCTED: 2024  SOURCES OF INFORMATION:  The following was gathered from a clinical interview with Mr. Morgan Marshall III and review of the available medical records. Mr. Marshall expressed an understanding of the purpose of the evaluation and consented to all procedures. Total licensed billing psychologists professional time including clinical interview, test administration and interpretation of tests administered by the billing psychologist, integration of test results and other clinical data, preparing the final report, and personally reporting results to the patient   Billin - 60 minutes  Telemedicine:   The patient location is: Home  The provider location is: Home  The chief complaint/medical necessity leading to consultation/medical necessity is: cognitive decline  Visit type: Virtual visit with synchronous audio and video  Total time spent with patient: 35 minutes  Each patient to whom he or she provides medical services by telemedicine is:  (1) informed of the relationship between the physician and patient and the respective role of any other health care provider with respect to management of the patient; and (2) notified that he or she may decline to receive medical services by telemedicine and may withdraw from such care at any time.  Consent/Emergency Plan: The patient expressed an understanding of the purpose of the evaluation and consented to all procedures. I informed the patient of limits to confidentiality and discussed an emergency plan.    NEUROPSYCHOLOGICAL EVALUATION - CONFIDENTIAL    SUMMARY/TREATMENT PLAN   Mr. Marshall is a 63 year old male with self-reported cognitive complaints in the setting of a family history of late onset dementia (likely Alzheimer's disease based on symptom  description). He remains functionally independent, but wants to be proactive in the even that he has early signs of cognitive impairment. He would benefit from labs and neuroimaging. He will be scheduled for neuropsychological testing in the near future. Low index of suspicion for a cognitive diagnosis at this time, but he would benefit from referral to behavioral neurology in the event of subtle cognitive symptoms on neuropsychological testing.     Diagnoses  Problem List Items Addressed This Visit          Neuro    Cognitive complaints - Primary    Current Assessment & Plan     Follow-up: Will schedule neuropsychological testing pending insurance approval.            Mr. Marshall will be provided the results of the evaluation.     Thank you for allowing me to participate in Mr. Mock care.  If you have any questions, please contact me at [phone].    Geronimo Bhat Psy.D., DANIAP  Board Certified in Clinical Neuropsychology  Department of Neurology    HISTORY OF PRESENT ILLNESS: Mr. Morgan Marshall III is a 63 y.o., right-handed, male with 14 years of education who was referred for a neuropsychological evaluation in the setting of self-reported cognitive concerns. 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 He wants to be proactive about his health and identify opportunities for early intervention if needed. 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     Mr. Marshall reported longstanding difficulties with sustained attention/focus that have improved with age. For instance, he indicated that he couldn't focus long enough to read a paragraph during his education, but now regularly reads for an hour daily. While he generally doesn't notice any issues with reading comprehension, he finds that he has to exert  "mental energy to making sure he follows instructions properly. For example, a medication instruction may be "take 2 pills once daily," but he can trouble differentiating this from "take 1 pill twice daily" unless putting forth mental energy/focus. He described other changes in attention/working memory, finding that he has to follow 1 recipe instruction at a time rather than being able to retain/complete several steps. He feels that mental arithmetic is a longstanding strength, but now more difficult than in the past. For instance, he recently had to organize $3,000 in cash in stacks of $100 in order to calculate it all rather than being able to count it all at once. Mr. Marshall has trouble tracking what he has or hasn't done in the shower, forgetting whether or not he has already used soap. 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 He has made inattentive errors, such as going into the passenger side of his truck rather than the  side even though he was by himself.     Despite these issues, Mr. Marshall feels that he is still functioning well. For instance, he can have trouble tracking whether or not he used soap in the shower, but he has no trouble tracking all of the tasks associated with managing/flipping a property. He also has no issues with planning and generally feels that he is managing all of his responsibilities well.     Neuropsychiatric Symptoms:  Hallucinations: Denied  Delusional/Paranoid Thinking: Denied  Depression/Labile Mood: Wife has fibomylagia and bipolar disorder, which can impact his mood, but he feels that he is managing well all things considered.     Anxiety: Denied    DAILY FUNCTIONING:  BASIC ADLS:  Feeding: independent. He never felt that he had a great sense of smell. No change from baseline. He notices trouble losing weight even with diet " modifications.    Dressing: independent  Bathing: independent    IADLS:  Support System: Resides with wife. 3 children live within an hour of them.   Appointment Management: independent, he is diligent about keeping a calendar, but he has made some mistakes, such as thinking he read something on the calendar that he didn't. For instance, he thought he was supposed to watch his granddaughter today, but it wasn't the case.   Medication Compliance: independent. He manages both he and his wife's medication. He reported strong adherence.   Financial Management: independent, he continues to manage with no reported problems.   Cooking: He has never been great at following recipes, but feels that it's worse than in the past.   Driving: He continues to drive with no recent MVCs. He usually has to make multiple stops when driving and finds that he can go in a different order than planned, which is new. For instance, he can plan to go to location A, B, and C based on their location, but may go from A to C due to inattentiveness, adding considerable time to his trip.        BRAIN HEALTH RISK FACTORS:  Hearing Loss: Denied, he notices some issues with low dialogue when watching television, but a recent audiology eam was normal.   Falls: Denied  Sleep: Currently undergoing work-up for sleep apnea, which he indicated is likely. He typically sleeps from 9/10pm until 4:45am. He feels sleepy after eating lunch, sometimes taking a 15-20 minute nap.     MEDICAL HISTORY: Mr. Marshall  has a past medical history of Allergy, Hyperlipidemia, and Iron deficiency anemia (2015).    NEUROIMAGING: None on file.     SUBSTANCE USE: Mr. Marshall  reports that he mostly quit smoking as a teenager. He really didn't smoke in his 20's and then smoked about a cigarette a week starting in his 30's. He now smokes a cigar per week instead. He typically has 1-2 drinks, 2-3 nights per week. No history of substance abuse.     CURRENT MEDICATIONS:    Current  Outpatient Medications:     atorvastatin (LIPITOR) 40 MG tablet, TAKE 1 TABLET BY MOUTH EVERY DAY -- DISCONTINUE PRAVASTATIN, Disp: 90 tablet, Rfl: 3    azelastine (ASTELIN) 137 mcg (0.1 %) nasal spray, 1 spray (137 mcg total) by Nasal route 2 (two) times daily., Disp: 30 mL, Rfl: 0    famotidine (PEPCID) 40 MG tablet, Take 1 tablet (40 mg total) by mouth once daily., Disp: 30 tablet, Rfl: 11    mometasone (NASONEX) 50 mcg/actuation nasal spray, 2 sprays by Nasal route once daily. (Patient not taking: Reported on 10/31/2023), Disp: 17 g, Rfl: 3     PSYCHIATRIC HISTORY: Engaged with mental health providers related to his wife's bipolar diagnosis. He has also followed a provider individually, but again learning how to manage his wife diagnosis. He hasn't been followed for several years.     SUICIDAL IDEATION:  History: Denied  Active Suicidal Ideation:Denied  Plan/Intent: Denied    FAMILY HISTORY: family history includes Hyperlipidemia in his brother, father, sister, and sister.    PSYCHOSOCIAL HISTORY:   Education:   Level Attained: Associate's degree.    Learning Difficulties: Endorsed, longstanding weakness with reading and attention. He felt that he couldn't focus long enough to read or complete his homework. He felt that he couldn't read a paragraph until his 30's. His ability to focus improved into his 40's to the point that he was able to finish books. He is now able to read for about an hour daily.    Repeated Grade: Denied, but suspects that he could have been held back in elementary school. He did better later into his education, but English was a persistent weakness.      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 He now owns, manages, and flips properties.     Relationship Status:   : Yes, 39 years.    Children: 3    MENTAL STATUS AND OBSERVATIONS:  APPEARANCE: Appropriately dressed/groomed.     ALERTNESS/ORIENTATION: Attentive and alert. He demonstrated intact episodic memory for recent events.    GAIT/MOTOR: Unable to assess.   SENSORY: Corrective lenses.   SPEECH/LANGUAGE: Normal in rate, rhythm, tone, and volume. Expressive and receptive language were grossly intact.  STATED MOOD/AFFECT: Mood was euthymic.   INTERPERSONAL BEHAVIOR: Rapport was quickly and easily established   THOUGHT PROCESSES: Thoughts seemed logical and goal-directed.

## 2024-06-12 ENCOUNTER — OFFICE VISIT (OUTPATIENT)
Dept: NEUROLOGY | Facility: CLINIC | Age: 64
End: 2024-06-12
Payer: COMMERCIAL

## 2024-06-12 DIAGNOSIS — R41.9 COGNITIVE COMPLAINTS: Primary | ICD-10-CM

## 2024-06-12 PROCEDURE — 96116 NUBHVL XM PHYS/QHP 1ST HR: CPT | Mod: 95,,, | Performed by: PSYCHIATRY & NEUROLOGY

## 2024-06-12 PROCEDURE — 99499 UNLISTED E&M SERVICE: CPT | Mod: 95,,, | Performed by: PSYCHIATRY & NEUROLOGY

## 2024-07-08 ENCOUNTER — OFFICE VISIT (OUTPATIENT)
Dept: NEUROLOGY | Facility: CLINIC | Age: 64
End: 2024-07-08
Payer: COMMERCIAL

## 2024-07-08 DIAGNOSIS — R41.9 COGNITIVE COMPLAINTS: Primary | ICD-10-CM

## 2024-07-08 PROCEDURE — 96133 NRPSYC TST EVAL PHYS/QHP EA: CPT | Mod: S$GLB,,, | Performed by: PSYCHIATRY & NEUROLOGY

## 2024-07-08 PROCEDURE — 96138 PSYCL/NRPSYC TECH 1ST: CPT | Mod: S$GLB,,, | Performed by: PSYCHIATRY & NEUROLOGY

## 2024-07-08 PROCEDURE — 96139 PSYCL/NRPSYC TST TECH EA: CPT | Mod: S$GLB,,, | Performed by: PSYCHIATRY & NEUROLOGY

## 2024-07-08 PROCEDURE — 99499 UNLISTED E&M SERVICE: CPT | Mod: S$GLB,,, | Performed by: PSYCHIATRY & NEUROLOGY

## 2024-07-08 PROCEDURE — 96132 NRPSYC TST EVAL PHYS/QHP 1ST: CPT | Mod: S$GLB,,, | Performed by: PSYCHIATRY & NEUROLOGY

## 2024-07-08 NOTE — PROGRESS NOTES
NEUROPSYCHOLOGY CONSULT   Referral Information  Name: Morgan Marshall III  MRN: [id number]  : 1960  Age: 64 y.o.  Race: White  Gender: male  REFERRAL SOURCE: Avel Soni MD   DATE CONDUCTED: 2024  SOURCES OF INFORMATION:  The following was gathered from a clinical interview with Mr. Morgan Marshall III and review of the available medical records. Mr. Marshall expressed an understanding of the purpose of the evaluation and consented to all procedures. Total licensed billing psychologists professional time including clinical interview, test administration and interpretation of tests administered by the billing psychologist, integration of test results and other clinical data, preparing the final report, and personally reporting results to the patient   [place] - 60 minutes (2024), [id number] -120 minutes (2024), [id number] - 178 minutes (2024)    NEUROPSYCHOLOGICAL EVALUATION - CONFIDENTIAL    SUMMARY/TREATMENT PLAN   Mr. Marshall is a 63 year old male with self-reported cognitive complaints in the setting of a family history of late onset dementia (likely Alzheimer's disease based on symptom description). He described longstanding inattention that has improved with age, but he finds that he has to exert more mental energy for basic tasks, such as following medication instructions or counting cash. He is also prone to inattentive errors. He remains functionally independent, but wants to be proactive in the event that he has early signs of cognitive impairment. He has not undergone work-up for cognitive complaints (labs, neuroimaging, neuro exam).     Neuropsychological testing was within normal limits. He does not meet criteria for a cognitive diagnosis. Mild encoding and planning inefficiencies were observed on testing, but nothing else of note. He may benefit from completing work-up and/or referral to neurology if he remains concerned about his cognition.     Diagnoses  Problem List  "Items Addressed This Visit          Neuro    Cognitive complaints - Primary    Current Assessment & Plan     Work-up: Labs for cognitive complaints, neuroimaging, and possible referral to neurology.     Brain Health: Sleep hygiene (including treatment for sleep apnea). Prioritize physical/social/cognitive activity/stimulation. Maintain a heart healthy diet.     Follow-up: Interval testing in 3-5 years.           Mr. Marshall will be provided the results of the evaluation.     Thank you for allowing me to participate in Mr. Mock care.  If you have any questions, please contact me at [phone].    Geronimo Bhat Psy.D., ABPP  Board Certified in Clinical Neuropsychology  Department of Neurology    HISTORY OF PRESENT ILLNESS: Mr. Morgan Marshall III is a 64 y.o., right-handed, male with 14 years of education who was referred for a neuropsychological evaluation in the setting of self-reported cognitive concerns. 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 He wants to be proactive about his health and identify opportunities for early intervention if needed. 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     Mr. Marshall reported longstanding difficulties with sustained attention/focus that have improved with age. For instance, he indicated that he couldn't focus long enough to read a paragraph during his education, but now regularly reads for an hour daily. While he generally doesn't notice any issues with reading comprehension, he finds that he has to exert mental energy to making sure he follows instructions properly. For example, a medication instruction may be "take 2 pills once daily," but he can have trouble differentiating this from "take 1 pill twice daily" unless putting forth mental energy/focus. He " described other changes in attention/working memory, finding that he has to follow 1 recipe instruction at a time rather than being able to retain/complete several steps. He feels that mental arithmetic is a longstanding strength, but now more difficult than in the past. For instance, he recently had to organize $3,000 in cash in stacks of $100 in order to calculate it all rather than being able to count it all at once. Mr. Marshall has trouble tracking what he has or hasn't done in the shower, forgetting whether or not he has already used soap. 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 He has made inattentive errors, such as going into the passenger side of his truck rather than the  side even though he was by himself.     Despite these issues, Mr. Marshall feels that he is still functioning well. For instance, he can have trouble tracking whether or not he used soap in the shower, but he has no trouble tracking all of the tasks associated with managing/flipping a property. He also has no issues with planning and generally feels that he is managing all of his responsibilities well.     Neuropsychiatric Symptoms:  Hallucinations: Denied  Delusional/Paranoid Thinking: Denied  Depression/Labile Mood: Wife has fibomylagia and bipolar disorder, which can impact his mood, but he feels that he is managing well all things considered.     Anxiety: Denied    DAILY FUNCTIONING:  BASIC ADLS:  Feeding: independent. He never felt that he had a great sense of smell. No change from baseline. He notices trouble losing weight even with diet modifications.    Dressing: independent  Bathing: independent    IADLS:  Support System: Resides with wife. 3 children live within an hour of them.   Appointment Management: independent, he is diligent about keeping a calendar, but he has made some  mistakes, such as thinking he read something on the calendar that he didn't. For instance, he thought he was supposed to watch his granddaughter today, but it wasn't the case.   Medication Compliance: independent. He manages both he and his wife's medication. He reported strong adherence.   Financial Management: independent, he continues to manage with no reported problems.   Cooking: He has never been great at following recipes, but feels that it's worse than in the past.   Driving: He continues to drive with no recent MVCs. He usually has to make multiple stops when driving and finds that he can go in a different order than planned, which is new. For instance, he can plan to go to location A, B, and C based on their location, but may go from A to C due to inattentiveness, adding considerable time to his trip.        BRAIN HEALTH RISK FACTORS:  Hearing Loss: Denied, he notices some issues with low dialogue when watching television, but a recent audiology eam was normal.   Falls: Denied  Sleep: Currently undergoing work-up for sleep apnea, which he indicated is likely. He typically sleeps from 9/10pm until 4:45am. He feels sleepy after eating lunch, sometimes taking a 15-20 minute nap.     MEDICAL HISTORY: Mr. Marshall  has a past medical history of Allergy, Hyperlipidemia, and Iron deficiency anemia (2015).    NEUROIMAGING: None on file.     SUBSTANCE USE: Mr. Marshall  reports that he mostly quit smoking as a teenager. He really didn't smoke in his 20's and then smoked about a cigarette a week starting in his 30's. He now smokes a cigar per week instead. He typically has 1-2 drinks, 2-3 nights per week. No history of substance abuse.     CURRENT MEDICATIONS:    Current Outpatient Medications:     atorvastatin (LIPITOR) 40 MG tablet, TAKE 1 TABLET BY MOUTH EVERY DAY -- DISCONTINUE PRAVASTATIN, Disp: 90 tablet, Rfl: 3    azelastine (ASTELIN) 137 mcg (0.1 %) nasal spray, 1 spray (137 mcg total) by Nasal route 2 (two)  times daily., Disp: 30 mL, Rfl: 0    famotidine (PEPCID) 40 MG tablet, Take 1 tablet (40 mg total) by mouth once daily., Disp: 30 tablet, Rfl: 11    mometasone (NASONEX) 50 mcg/actuation nasal spray, 2 sprays by Nasal route once daily. (Patient not taking: Reported on 10/31/2023), Disp: 17 g, Rfl: 3     PSYCHIATRIC HISTORY: Engaged with mental health providers related to his wife's bipolar diagnosis. He has also followed a provider individually, but again learning how to manage his wife diagnosis. He hasn't been followed for several years.     SUICIDAL IDEATION:  History: Denied  Active Suicidal Ideation:Denied  Plan/Intent: Denied    FAMILY HISTORY: family history includes Hyperlipidemia in his brother, father, sister, and sister.    PSYCHOSOCIAL HISTORY:   Education:   Level Attained: Associate's degree.    Learning Difficulties: Endorsed, longstanding weakness with reading and attention. He felt that he couldn't focus long enough to read or complete his homework. He felt that he couldn't read a paragraph until his 30's. His ability to focus improved into his 40's to the point that he was able to finish books. He is now able to read for about an hour daily.    Repeated Grade: Denied, but suspects that he could have been held back in elementary school. He did better later into his education, but English was a persistent weakness.      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 He now owns, manages, and flips properties.     Relationship Status:   : Yes, 39 years.    Children: 3    MENTAL STATUS AND OBSERVATIONS:  APPEARANCE: Appropriately dressed/groomed.    ALERTNESS/ORIENTATION: Attentive and alert. He demonstrated intact episodic memory for recent events.    GAIT/MOTOR: Ambulated independently.   SENSORY: Corrective lenses.   SPEECH/LANGUAGE: Normal in rate, rhythm, tone, and volume. Expressive and receptive  language were grossly intact.  STATED MOOD/AFFECT: Mood was euthymic.   INTERPERSONAL BEHAVIOR: Rapport was quickly and easily established   THOUGHT PROCESSES: Thoughts seemed logical and goal-directed.    BEHAVIORAL OBSERVATIONS: standalone and embedded PVTs were WNL. He was able to understand/retain instructions with minimal difficulty. He commented that the evaluation was difficult at the end of the visit. Scores appear to be a valid/reliable measure of his current cognitive abilities.      APPENDIX/TEST RESULTS:  TESTS ADMINISTERED:  Clinical Interview and Review of Records, MSVT, Dominick Cognitive Assessment (MoCA), Test of Premorbid Functioning (TOPF), selected subtests from the Wechsler Adult Intelligence Scale - 4th edition (WAIS-IV, [facility] Demographically Adjusted Norms), [place] Verbal Learning Test - second edition (CVLT-II), Logical Memory subtest from the WMS-IV ([facility] Demographically Adjusted Norms), Naming subtest from the [facility], Controlled Oral Word Association Test ([facility] Norms), Animal Fluency ([facility] Norms), Trailmaking Test ([facility] Norms), Lopez Complex Figure (Copy Trial), Grooved Pegboard Test ([facility] Norms), [place] Card Sorting Test - 64 card version (WCST-64), Generalized Anxiety Disorder - 7 (FELIX-7), and the Ashley Depression Scale - 2nd edition (BDI-2).     Score Label T-Score Standard Score Z-Score Scaled Score %ile Rank   Exceptionally High > 70 > 130 > 2.0  > 16 > 98   Above Average 64-69 120-129 1.4-1.9 15 91-97   High Average 57-63 110-119 0.7-1.3 12-14 75-90   Average 44-56  0.6 to -0.6 8-11 25-74   Low Average 37-43 80-89 -1.3 to -0.7 6-7 9-24   Below Average 30-36 70-79 -2.0 to -1.4 4-5 2-8   Exceptionally Low < 30 < 70  < -2.0 < 4 < 2      Mental Status: Oriented to the month, year, and day of the week.  He was 1 off the exact date.  Fully oriented to location.  7/2024 MoCA = 21/30     Pre-morbid/Baseline: Estimated to be in the average range.     Language: Average semantic  verbal fluency and low-average letter verbal fluency.  Average confrontation naming.     Visuospatial: He accurately gennaro a clock face and correctly set the clock hands.  Rushed approach to his copy of a complex figure, which she completed in 81 seconds.  His approach seemed to contribute to several mild errors.  While as copy demonstrated a largely intact appreciation for the gestalt of the figure, several details were not the correct proportion.  Still, there did not seem to be clear evidence of visual spatial dysfunction.     Learning/Memory: Overall encoding of a supraspan word list was high average as he recalled 6, 10, 12, 13, and 9 of 16 words.  He seemed to identify the semantic categories and group to them, but not in a deliberate/organized fashion.  He seemed more deliberate during the short and long delay free recall trials.  He then encoded 5 words from a 2nd distractor list.  He freely recalled 13 words following exposure to the distractor list and 12 with categorical cuing.  Retention was intact following a long delay as he freely recalled 14 words and 13 with categorical cuing.  Minimal repetition and intrusion errors across the test.  Recognition was average.  He encoded 5/5 words after 2 trials on the [place], freely recalling 0 words following a brief delay.  He recalled 1 word with categorical cuing and correctly identified 3 words with multiple choice cuing.  Overall encoding of two short stories was low average.  He retained 6/11 previously encoded details from the first story following a long delay with perfect retention of the 2nd story.  Overall recall was low average.  Responses to yes no questions was low average.     Executive Functioning: One trial learning/encoding was low average to average.  He provided 4/5 correct responses on a serial 7 subtraction task.  Average performance on tasks of working memory and processing speed.  Low-average performance on a test of conceptual/abstract  reasoning.  High average performance on a task requiring him to maintain a complex set.     Motor: Fine motor abilities were average, bilaterally, although he finished the test faster with his non dominant left hand.     Mood: He did not endorsed clinical depression or anxiety.      Raw Score Type of Standardized Score Standardized Score Percentile/CP   MSVT  - - -   MSVT  - - -   MSVT Cons 100 - - -   MSVT  - - -   MSVT FR 70 - - -   ACS LM II Rec 21 - - -   ACS RDS 9 - - -   CVLT-II FC 16 - - -   PREMORBID FUNCTIONING Raw Score Type of Standardized Score Standardized Score Percentile/CP   TOPF simple dem. eFSIQ -  68   TOPF pred. eFSIQ -  63   TOPF simple + pred. eFSIQ -  68   INTELLECTUAL FUNCTIONING Raw Score Type of Standardized Score Standardized Score Percentile/CP   WAIS-IV       WMI - T 45    PSI - T 45    Similarities 24 T 40    Digit Span 26 T 47          DS Forward 10 ss 10 50         DS Backward 8 ss 10 50         DS Sequence 8 ss 10 50         Longest Digit Forward 7 - - -         Longest Digit Backward 4 - - -         Longest Digit Sequence 6 - - -   Arithmetic 14 T 44    Symbol Search 24 T 41    Coding 57 T 50    COGNITIVE SCREENING Raw Score Type of Standardized Score Standardized Score Percentile/CP   MoCA 21 - - -   Orientation - Place 2/2 - - -   Orientation - Date 3/4 - - -   LANGUAGE FUNCTIONING Raw Score Type of Standardized Score Standardized Score Percentile/CP   WAIS-IV Similarities 24 ss 9 37   TOPF Word Reading 49  66   NAB Naming 30 Tscore 54 66   NAB Naming Percent Correct After Semantic Cuing 100 - - 100   FAS 27 Tscore 38 12   Letter F  8 zscore FALSE 50   Animal Naming 19 Tscore 51 54   VISUOSPATIAL FUNCTIONING Raw Score Type of Standardized Score Standardized Score Percentile/CP   RCFT Copy 26 - - <1   RCFT Time to Copy 81 - - >16   LEARNING & MEMORY Raw Score Type of Standardized Score Standardized Score Percentile/CP   CVLT-II       Trials  1-5 (T-Score) 50 Tscore 62 88   List A Trial 1 6 zscore 0 50.000   List A Trial 5 9 zscore -0.5 31   List B 5 zscore 0 50   SDFR 13 zscore 1.5 93   SDCR 12 zscore 1 84   LDFR 14 zscore 2 98   LDCR 13 zscore 1 84   Semantic Clustering 1.4 zscore 1 84   Learning Alexander 0.9 zscore -0.5 31   Repetitions 3 zscore 0 50   Intrusions 6 zscore 0.5 69   Recognition Hits 15 zscore 0.5 69   False Positives 2 zscore -0.5 31   Discriminability 3.1 zscore 0.5 69   WMS-IV       Auditory Immediate (additional score) -  50   Auditory Delayed (additional score) -  63   Auditory Memory - T 51    WMS-IV Subtests       LM I 18 T 38    LM II 14 T 38    LM Recognition 21 - - 10-16   (CVLT-II Trials 1-5) 62 T 60    (CVLT-II Long Delay) 2 T 68    ATTENTION/WORKING MEMORY Raw Score Type of Standardized Score Standardized Score Percentile/CP   WAIS-IV WMI -  50   WAIS-IV Digit Span 26 ss 10 50         DS Forward 10 ss 10 50         DS Backward 8 ss 10 50         DS Sequence 8 ss 10 50         Longest Digit Forward 7 - - -         Longest Digit Backward 4 - - -         Longest Digit Sequence 6 - - -   WAIS-IV Arithmetic 14 ss 10 50   MENTAL PROCESSING SPEED Raw Score Type of Standardized Score Standardized Score Percentile/CP   WAIS-IV PSI - SS 94 34   WAIS-IV Symbol Search 24 ss 8 25   WAIS-IV Coding 57 ss 10 50   TMT A  29 Tscore 49 46   TMT A errors 0 - - -   EXECUTIVE FUNCTIONING Raw Score Type of Standardized Score Standardized Score Percentile/CP   TMT B 50 Tscore 61 86   TMT B errors 0 - - -   WAIS-IV Similarities 24 ss 9 37   FRONTOMOTOR  Raw Score Type of Standardized Score Standardized Score Percentile/CP   GPT DH 79 Tscore 46 34   GPD NDH 75 Tscore 53 62   MOOD & PERSONALITY Raw Score Type of Standardized Score Standardized Score Percentile/CP   BDI-2 6 - - -   FELIX-7 0 - - -

## 2024-07-10 NOTE — ASSESSMENT & PLAN NOTE
Work-up: Labs for cognitive complaints, neuroimaging, and possible referral to neurology.     Brain Health: Sleep hygiene (including treatment for sleep apnea). Prioritize physical/social/cognitive activity/stimulation. Maintain a heart healthy diet.     Follow-up: Interval testing in 3-5 years.

## 2024-07-24 ENCOUNTER — OFFICE VISIT (OUTPATIENT)
Dept: NEUROLOGY | Facility: CLINIC | Age: 64
End: 2024-07-24
Payer: COMMERCIAL

## 2024-07-24 DIAGNOSIS — R41.9 COGNITIVE COMPLAINTS: Primary | ICD-10-CM

## 2024-07-24 PROCEDURE — 99499 UNLISTED E&M SERVICE: CPT | Mod: 95,,, | Performed by: PSYCHIATRY & NEUROLOGY

## 2024-07-24 NOTE — PROGRESS NOTES
NEUROPSYCHOLOGICAL EVALUATION - CONFIDENTIAL  FEEDBACK NOTE    On 7/24/2024, I provided Mr. Morgan Marshall III neuropsychological evaluation results. Please see the full report for a comprehensive overview of the findings. Mr. Marshall was provided a copy of the report and invited to call with additional questions.      Geronimo Bhat Psy.D., ABPP  Board Certified in Clinical Neuropsychology  [facility] - Department of Neurology

## 2024-09-30 NOTE — PROGRESS NOTES
"Subjective:      Patient ID: Morgan Marshall III is a 64 y.o. male.    Chief Complaint:  " Memory difficulties ".     HPI 64 Years old C. Male with PMHx of TRACY / " Cognitive complaints "and others Medical issues   came for the evaluation and recommendation of " Memory difficulties ".      Started: about 5 years.   Describes: short term memory difficulties.   Timing: constant.   Frequency: daily.   Pain:  none.   Location: CNS.   Family: Mother with Dementia.   Medications: Lipitor / Pepcid.   Worsen: " poor attention ".   Alleviated: none.   Associated symptoms: words findings difficulties / forgotten commitment   Triggers: none.   Prodrome symptoms: none.           Referral by PCP.         Stable / feels no " real progression ".        No Hx of Seizures / Stroke / no significant CHI.        No Visual hallucinations / no getting lost driving / no behavioral changes / no asking same questions.     GIULIANA COGNITIVE ASSESSMENT (MOCA) TOTAL SCORE:  / 30 - missed 1 words out of 5 in 5 minutes.      NORMAL-MILD NCD 26-30     MILD DEMENTIA 20-25     MODERATE DEMENTIA 10-19     SEVERE DEMENTIA <10.    DATE             TOTAL SCORE 29           VISUOSPATIAL EXECUTIVE (5) 5           NAMING (3) 3           ATTENTION (6) 6           LANGUAGE (3) 3           ABSTRACTION(2) 2           RECALL (5) 4           ORIENTATION (6) 6              Review of Systems   Neurological:         Memory difficulties.    All other systems reviewed and are negative.    Objective:     Neurological Exam  Mental Status  Alert. Recalls 3 of 3 objects immediately. At 5 minutes recalls 3 of 3 objects. Able to copy figure. Clock drawing is normal. Patient is nonverbal. Language is fluent with no aphasia. Attention and concentration are normal. Fund of knowledge is appropriate for level of education. Apraxia absent.    Cranial Nerves  CN II: Visual acuity is normal. Visual fields full to confrontation.  CN III, IV, VI: Extraocular movements intact " bilaterally. Normal lids and orbits bilaterally. Pupils equal round and reactive to light bilaterally.  CN V: Facial sensation is normal.  CN VII: Full and symmetric facial movement.  CN VIII: Hearing is normal.  CN IX, X: Palate elevates symmetrically. Normal gag reflex.  CN XI: Shoulder shrug strength is normal.  CN XII: Tongue midline without atrophy or fasciculations.    Motor  Normal muscle bulk throughout. No fasciculations present. Normal muscle tone. No abnormal involuntary movements. Strength is 5/5 throughout all four extremities.    Sensory  Sensation is intact to light touch, pinprick, vibration and proprioception in all four extremities.    Reflexes                                            Right                      Left  Brachioradialis                    2+                         2+  Biceps                                 2+                         2+  Triceps                                2+                         2+  Finger flex                           2+                         2+  Hamstring                            2+                         2+  Patellar                                2+                         2+  Achilles                                2+                         2+  Jaw jerk absent.  Right pathological reflexes: Tello's absent. Crossed adductor absent.  Left pathological reflexes: Tello's absent. Ankle clonus absent.    Coordination    Finger-to-nose, rapid alternating movements and heel-to-shin normal bilaterally without dysmetria.      Physical Exam  Constitutional:       Appearance: Normal appearance. He is normal weight.   HENT:      Head: Normocephalic and atraumatic.      Right Ear: Tympanic membrane normal.      Left Ear: Tympanic membrane normal.      Nose: Nose normal.      Mouth/Throat:      Mouth: Mucous membranes are moist.      Pharynx: Oropharynx is clear.   Eyes:      General: Lids are normal.      Extraocular Movements: Extraocular movements  "intact.      Conjunctiva/sclera: Conjunctivae normal.      Pupils: Pupils are equal, round, and reactive to light.   Cardiovascular:      Rate and Rhythm: Normal rate and regular rhythm.      Pulses: Normal pulses.      Heart sounds: Normal heart sounds.   Pulmonary:      Effort: Pulmonary effort is normal.      Breath sounds: Normal breath sounds.   Abdominal:      General: Abdomen is flat. Bowel sounds are normal.      Palpations: Abdomen is soft.   Genitourinary:     Comments: Deferred.   Musculoskeletal:         General: Normal range of motion.      Cervical back: Normal range of motion and neck supple.   Skin:     General: Skin is warm and dry.      Capillary Refill: Capillary refill takes less than 2 seconds.   Neurological:      Mental Status: He is alert. Mental status is at baseline.      Motor: Motor strength is normal.     Coordination: Coordination is intact.      Deep Tendon Reflexes:      Reflex Scores:       Tricep reflexes are 2+ on the right side and 2+ on the left side.       Bicep reflexes are 2+ on the right side and 2+ on the left side.       Brachioradialis reflexes are 2+ on the right side and 2+ on the left side.       Patellar reflexes are 2+ on the right side and 2+ on the left side.       Achilles reflexes are 2+ on the right side and 2+ on the left side.  Psychiatric:         Mood and Affect: Mood normal.         Behavior: Behavior normal.         Thought Content: Thought content normal.         Judgment: Judgment normal.        Assessment:   64 Years old C.  Male with PMHX as above came of the evaluation of " Memory difficulties ".   - Short term memory difficulties.   - Family Hx of Alzheimer's Dementia.   - TRACY.   Plan:   Patient Neurological Assessment is non focal / missed one word out 5.     ATN profile.     Neuropsychological Assessment: 07/ 2024 - Neuropsychological testing was within normal limits. He does not meet criteria for a cognitive diagnosis.   Mild encoding and planning " "inefficiencies were observed on testing, but nothing else of note.   He may benefit from completing work-up and/or referral to neurology if he remains concerned about his cognition.     Saw sleep clinic - CPaP was indicated - " never got it "  He is using a mouth piece - " working great ".    Labs: CBC / CMP / TSH / Hgb A 1 C / Lipids panel - 2024 - non significant abnormalities.     I spent a total of 60 minutes on the day of the visit.This includes face to face time and non-face to face time preparing to see the patient (eg, review of tests), \  obtaining and/or reviewing separately obtained history, documenting clinical information in the electronic or other health record,   independently interpreting results and communicating results to the patient/family/caregiver, or care coordinator.    Please do not hesitate to contact me with any updates, questions or concerns.    Isaiah Vaca MD.  General Neurologist.   "

## 2024-10-04 ENCOUNTER — LAB VISIT (OUTPATIENT)
Dept: LAB | Facility: HOSPITAL | Age: 64
End: 2024-10-04
Attending: PSYCHIATRY & NEUROLOGY
Payer: COMMERCIAL

## 2024-10-04 ENCOUNTER — OFFICE VISIT (OUTPATIENT)
Dept: NEUROLOGY | Facility: CLINIC | Age: 64
End: 2024-10-04
Payer: COMMERCIAL

## 2024-10-04 VITALS
OXYGEN SATURATION: 99 % | BODY MASS INDEX: 28.12 KG/M2 | DIASTOLIC BLOOD PRESSURE: 75 MMHG | HEART RATE: 53 BPM | WEIGHT: 196.44 LBS | HEIGHT: 70 IN | RESPIRATION RATE: 16 BRPM | SYSTOLIC BLOOD PRESSURE: 117 MMHG

## 2024-10-04 DIAGNOSIS — R41.3 MEMORY DIFFICULTIES: ICD-10-CM

## 2024-10-04 DIAGNOSIS — R41.3 MEMORY DIFFICULTIES: Primary | ICD-10-CM

## 2024-10-04 DIAGNOSIS — Z82.0 FAMILY HISTORY OF ALZHEIMER DISEASE: ICD-10-CM

## 2024-10-04 PROCEDURE — 0361U NEURFLMNT LT CHN DIG IA QUAN: CPT | Performed by: PSYCHIATRY & NEUROLOGY

## 2024-10-04 PROCEDURE — 83519 RIA NONANTIBODY: CPT | Mod: 59 | Performed by: PSYCHIATRY & NEUROLOGY

## 2024-10-04 PROCEDURE — 0346U AD DETECT, BETA-AMYLOID 42/40 RATIO: CPT | Performed by: PSYCHIATRY & NEUROLOGY

## 2024-10-04 PROCEDURE — 83520 IMMUNOASSAY QUANT NOS NONAB: CPT | Performed by: PSYCHIATRY & NEUROLOGY

## 2024-10-04 PROCEDURE — 99999 PR PBB SHADOW E&M-EST. PATIENT-LVL IV: CPT | Mod: PBBFAC,,, | Performed by: PSYCHIATRY & NEUROLOGY

## 2024-10-09 LAB — NEUROFILAMENT LIGHT CHAIN, PLASMA: 8.8 PG/ML

## 2024-10-10 LAB — PHOSPHO-TAU (181P): 0.44 PG/ML (ref 0–0.97)

## 2024-10-28 ENCOUNTER — LAB VISIT (OUTPATIENT)
Dept: LAB | Facility: HOSPITAL | Age: 64
End: 2024-10-28
Attending: FAMILY MEDICINE
Payer: COMMERCIAL

## 2024-10-28 DIAGNOSIS — Z13.6 SCREENING FOR CARDIOVASCULAR CONDITION: ICD-10-CM

## 2024-10-28 DIAGNOSIS — Z12.5 PROSTATE CANCER SCREENING: ICD-10-CM

## 2024-10-28 DIAGNOSIS — R73.9 HYPERGLYCEMIA: ICD-10-CM

## 2024-10-28 DIAGNOSIS — Z00.00 ROUTINE HEALTH MAINTENANCE: ICD-10-CM

## 2024-10-28 LAB
ALBUMIN SERPL BCP-MCNC: 4.5 G/DL (ref 3.5–5.2)
ALP SERPL-CCNC: 89 U/L (ref 38–126)
ALT SERPL W/O P-5'-P-CCNC: 33 U/L (ref 10–44)
ANION GAP SERPL CALC-SCNC: 4 MMOL/L (ref 8–16)
AST SERPL-CCNC: 30 U/L (ref 15–46)
BASOPHILS # BLD AUTO: 0.09 K/UL (ref 0–0.2)
BASOPHILS NFR BLD: 1.7 % (ref 0–1.9)
BILIRUB SERPL-MCNC: 1 MG/DL (ref 0.1–1)
CALCIUM SERPL-MCNC: 9.5 MG/DL (ref 8.7–10.5)
CHLORIDE SERPL-SCNC: 107 MMOL/L (ref 95–110)
CHOLEST SERPL-MCNC: 203 MG/DL (ref 120–199)
CHOLEST/HDLC SERPL: 3.7 {RATIO} (ref 2–5)
CO2 SERPL-SCNC: 29 MMOL/L (ref 23–29)
COMPLEXED PSA SERPL-MCNC: 0.25 NG/ML (ref 0–4)
CREAT SERPL-MCNC: 1.06 MG/DL (ref 0.5–1.4)
DIFFERENTIAL METHOD BLD: ABNORMAL
EOSINOPHIL # BLD AUTO: 0.4 K/UL (ref 0–0.5)
EOSINOPHIL NFR BLD: 8.2 % (ref 0–8)
ERYTHROCYTE [DISTWIDTH] IN BLOOD BY AUTOMATED COUNT: 11.9 % (ref 11.5–14.5)
EST. GFR  (NO RACE VARIABLE): >60 ML/MIN/1.73 M^2
ESTIMATED AVG GLUCOSE: 103 MG/DL (ref 68–131)
GLUCOSE SERPL-MCNC: 93 MG/DL (ref 70–110)
HBA1C MFR BLD: 5.2 % (ref 4–5.6)
HCT VFR BLD AUTO: 45.8 % (ref 40–54)
HDLC SERPL-MCNC: 55 MG/DL (ref 40–75)
HDLC SERPL: 27.1 % (ref 20–50)
HGB BLD-MCNC: 15.3 G/DL (ref 14–18)
IMM GRANULOCYTES # BLD AUTO: 0.01 K/UL (ref 0–0.04)
IMM GRANULOCYTES NFR BLD AUTO: 0.2 % (ref 0–0.5)
LDLC SERPL CALC-MCNC: 132.2 MG/DL (ref 63–159)
LYMPHOCYTES # BLD AUTO: 1.5 K/UL (ref 1–4.8)
LYMPHOCYTES NFR BLD: 27 % (ref 18–48)
MCH RBC QN AUTO: 32.1 PG (ref 27–31)
MCHC RBC AUTO-ENTMCNC: 33.4 G/DL (ref 32–36)
MCV RBC AUTO: 96 FL (ref 82–98)
MONOCYTES # BLD AUTO: 0.6 K/UL (ref 0.3–1)
MONOCYTES NFR BLD: 11.7 % (ref 4–15)
NEUTROPHILS # BLD AUTO: 2.8 K/UL (ref 1.8–7.7)
NEUTROPHILS NFR BLD: 51.2 % (ref 38–73)
NONHDLC SERPL-MCNC: 148 MG/DL
NRBC BLD-RTO: 0 /100 WBC
PLATELET # BLD AUTO: 262 K/UL (ref 150–450)
PMV BLD AUTO: 10.4 FL (ref 9.2–12.9)
POTASSIUM SERPL-SCNC: 5 MMOL/L (ref 3.5–5.1)
PROT SERPL-MCNC: 7.8 G/DL (ref 6–8.4)
RBC # BLD AUTO: 4.76 M/UL (ref 4.6–6.2)
SODIUM SERPL-SCNC: 140 MMOL/L (ref 136–145)
TRIGL SERPL-MCNC: 79 MG/DL (ref 30–150)
TSH SERPL DL<=0.005 MIU/L-ACNC: 2.33 UIU/ML (ref 0.4–4)
UUN UR-MCNC: 17 MG/DL (ref 2–20)
WBC # BLD AUTO: 5.38 K/UL (ref 3.9–12.7)

## 2024-10-28 PROCEDURE — 85025 COMPLETE CBC W/AUTO DIFF WBC: CPT | Mod: PN | Performed by: FAMILY MEDICINE

## 2024-10-28 PROCEDURE — 84153 ASSAY OF PSA TOTAL: CPT | Performed by: FAMILY MEDICINE

## 2024-10-28 PROCEDURE — 83036 HEMOGLOBIN GLYCOSYLATED A1C: CPT | Performed by: FAMILY MEDICINE

## 2024-10-28 PROCEDURE — 36415 COLL VENOUS BLD VENIPUNCTURE: CPT | Mod: PN | Performed by: FAMILY MEDICINE

## 2024-10-28 PROCEDURE — 80053 COMPREHEN METABOLIC PANEL: CPT | Mod: PN | Performed by: FAMILY MEDICINE

## 2024-10-28 PROCEDURE — 84443 ASSAY THYROID STIM HORMONE: CPT | Mod: PN | Performed by: FAMILY MEDICINE

## 2024-10-28 PROCEDURE — 80061 LIPID PANEL: CPT | Performed by: FAMILY MEDICINE

## 2024-10-31 ENCOUNTER — OFFICE VISIT (OUTPATIENT)
Dept: FAMILY MEDICINE | Facility: CLINIC | Age: 64
End: 2024-10-31
Payer: COMMERCIAL

## 2024-10-31 VITALS
SYSTOLIC BLOOD PRESSURE: 100 MMHG | HEIGHT: 70 IN | DIASTOLIC BLOOD PRESSURE: 70 MMHG | TEMPERATURE: 98 F | BODY MASS INDEX: 27.61 KG/M2 | HEART RATE: 63 BPM | WEIGHT: 192.88 LBS | OXYGEN SATURATION: 99 %

## 2024-10-31 DIAGNOSIS — Z00.00 ROUTINE HEALTH MAINTENANCE: Primary | ICD-10-CM

## 2024-10-31 DIAGNOSIS — R73.9 HYPERGLYCEMIA: ICD-10-CM

## 2024-10-31 DIAGNOSIS — Z23 NEED FOR SHINGLES VACCINE: ICD-10-CM

## 2024-10-31 PROCEDURE — 1159F MED LIST DOCD IN RCRD: CPT | Mod: CPTII,S$GLB,, | Performed by: FAMILY MEDICINE

## 2024-10-31 PROCEDURE — 3044F HG A1C LEVEL LT 7.0%: CPT | Mod: CPTII,S$GLB,, | Performed by: FAMILY MEDICINE

## 2024-10-31 PROCEDURE — 3078F DIAST BP <80 MM HG: CPT | Mod: CPTII,S$GLB,, | Performed by: FAMILY MEDICINE

## 2024-10-31 PROCEDURE — 90750 HZV VACC RECOMBINANT IM: CPT | Mod: S$GLB,,, | Performed by: FAMILY MEDICINE

## 2024-10-31 PROCEDURE — 3074F SYST BP LT 130 MM HG: CPT | Mod: CPTII,S$GLB,, | Performed by: FAMILY MEDICINE

## 2024-10-31 PROCEDURE — 90471 IMMUNIZATION ADMIN: CPT | Mod: S$GLB,,, | Performed by: FAMILY MEDICINE

## 2024-10-31 PROCEDURE — 99396 PREV VISIT EST AGE 40-64: CPT | Mod: 25,S$GLB,, | Performed by: FAMILY MEDICINE

## 2024-10-31 PROCEDURE — 3008F BODY MASS INDEX DOCD: CPT | Mod: CPTII,S$GLB,, | Performed by: FAMILY MEDICINE

## 2024-10-31 RX ORDER — FAMOTIDINE 40 MG/1
40 TABLET, FILM COATED ORAL DAILY
Qty: 90 TABLET | Refills: 3 | Status: SHIPPED | OUTPATIENT
Start: 2024-10-31 | End: 2025-10-31

## 2024-10-31 RX ORDER — ATORVASTATIN CALCIUM 40 MG/1
TABLET, FILM COATED ORAL
Qty: 90 TABLET | Refills: 3 | Status: SHIPPED | OUTPATIENT
Start: 2024-10-31

## 2024-11-03 ENCOUNTER — TELEPHONE (OUTPATIENT)
Dept: FAMILY MEDICINE | Facility: CLINIC | Age: 64
End: 2024-11-03
Payer: COMMERCIAL

## 2024-11-03 NOTE — PROGRESS NOTES
" Patient ID: Morgan Marshall III is a 64 y.o. male.    Chief Complaint: Annual Exam    HPI      Morgan Marshall III is a 64 y.o. male. here for annual exam.   No current complaints.               Review of Symptoms    Constitutional: Negative.    HENT: Negative.    Eyes: Negative.    Respiratory: Negative.    Cardiovascular: Negative.    Gastrointestinal: Negative.    Endocrine: Negative.    Genitourinary: Negative.    Musculoskeletal: Negative.    Skin: Negative.    Allergic/Immunologic: Negative.    Neurological: Negative.    Hematological: Negative.    Psychiatric/Behavioral: Negative.      Except as above in HPI      Vitals:    10/31/24 0953   BP: 100/70   BP Location: Right arm   Patient Position: Sitting   Pulse: 63   Temp: 98.1 °F (36.7 °C)   TempSrc: Oral   SpO2: 99%   Weight: 87.5 kg (192 lb 14.4 oz)   Height: 5' 10" (1.778 m)        Physical  Exam      Constitutional:  Oriented to person, place, and time. Appears well-developed and well-nourished.     HENT:   Head: Normocephalic and atraumatic.     Right Ear: Tympanic membrane, ear canal and External ear normal     Left Ear: Tympanic membrane, ear canal and External ear normal     Nose: Nose normal. No rhinorrhea or nasal deformity.     Mouth/Throat: Uvula is midline, oropharynx is clear and moist and mucous membranes are normal.      Eyes: Conjunctivae are normal. Right eye exhibits no discharge. Left eye exhibits no discharge. No scleral icterus.     Neck:  No JVD present. No tracheal deviation  []  Neck supple.   []  No Carotid bruit    Cardiovascular:  Regular rate and rhythm with normal S1 and S2     Pulmonary/Chest:   Clear to auscultation bilaterally without wheezes, rhonchi or rales    Musculoskeletal: Normal range of motion. No edema or tenderness.   No deformity     Lymphadenopathy:  No cervical adenopathy.     Neurological:  Alert and oriented to person, place, and time. Coordination normal.     Skin: Skin is warm and dry. No rash noted. "     Psychiatric: Normal mood and affect. Speech is normal and behavior is normal. Judgment and thought content normal.     Physical Exam              Complete Blood Count  Lab Results   Component Value Date    RBC 4.76 10/28/2024    HGB 15.3 10/28/2024    HCT 45.8 10/28/2024    MCV 96 10/28/2024    MCH 32.1 (H) 10/28/2024    MCHC 33.4 10/28/2024    RDW 11.9 10/28/2024     10/28/2024    MPV 10.4 10/28/2024    GRAN 2.8 10/28/2024    GRAN 51.2 10/28/2024    LYMPH 1.5 10/28/2024    LYMPH 27.0 10/28/2024    MONO 0.6 10/28/2024    MONO 11.7 10/28/2024    EOS 0.4 10/28/2024    BASO 0.09 10/28/2024    EOSINOPHIL 8.2 (H) 10/28/2024    BASOPHIL 1.7 10/28/2024    DIFFMETHOD Automated 10/28/2024       Comprehensive Metabolic Panel  Lab Results   Component Value Date    GLU 93 10/28/2024    BUN 17 10/28/2024    CREATININE 1.06 10/28/2024     10/28/2024    K 5.0 10/28/2024     10/28/2024    PROT 7.8 10/28/2024    ALBUMIN 4.5 10/28/2024    BILITOT 1.0 10/28/2024    AST 30 10/28/2024    ALKPHOS 89 10/28/2024    CO2 29 10/28/2024    ALT 33 10/28/2024    ANIONGAP 4 (L) 10/28/2024       TSH  Lab Results   Component Value Date    TSH 2.330 10/28/2024       Assessment / Plan:      ICD-10-CM ICD-9-CM   1. Routine health maintenance  Z00.00 V70.0   2. Need for shingles vaccine  Z23 V04.89   3. Hyperglycemia  R73.9 790.29     Routine health maintenance  -     Comprehensive Metabolic Panel; Future  -     CBC Auto Differential; Future  -     Lipid Panel; Future  -     TSH; Future  -     Hemoglobin A1C; Future    Need for shingles vaccine  -     varicella zoster (Shingrix) IM vaccine (>/= 49 yo)    Hyperglycemia  -     Comprehensive Metabolic Panel; Future  -     CBC Auto Differential; Future  -     Lipid Panel; Future  -     TSH; Future  -     Hemoglobin A1C; Future    Other orders  -     atorvastatin (LIPITOR) 40 MG tablet; TAKE 1 TABLET BY MOUTH EVERY DAY -- DISCONTINUE PRAVASTATIN  Dispense: 90 tablet; Refill: 3  -      famotidine (PEPCID) 40 MG tablet; Take 1 tablet (40 mg total) by mouth once daily.  Dispense: 90 tablet; Refill: 3

## 2024-11-05 ENCOUNTER — TELEPHONE (OUTPATIENT)
Dept: PHARMACY | Facility: CLINIC | Age: 64
End: 2024-11-05
Payer: COMMERCIAL

## 2024-11-05 NOTE — TELEPHONE ENCOUNTER
[facility]/Population Health Chart Review & Patient Outreach Details For Medication Adherence Project    Reason for Outreach Encounter: 3rd Party payor non-compliance report ([employer], [employer], [employer], etc)  2.  Patient Outreach Method: Reviewed patient chart   3.   Medication in question: atorvastatin 40 mg     atorvastatin  last filled  10/31/24 for 90 day supply    4.  Reviewed and or Updates Made To: Patient Chart  5. Outreach Outcomes and/or actions taken: Patient filled medication and is on track to be adherent  Additional Notes:

## 2024-11-12 ENCOUNTER — PATIENT MESSAGE (OUTPATIENT)
Dept: FAMILY MEDICINE | Facility: CLINIC | Age: 64
End: 2024-11-12
Payer: COMMERCIAL

## 2024-11-13 ENCOUNTER — OFFICE VISIT (OUTPATIENT)
Dept: FAMILY MEDICINE | Facility: CLINIC | Age: 64
End: 2024-11-13
Payer: COMMERCIAL

## 2024-11-13 ENCOUNTER — HOSPITAL ENCOUNTER (OUTPATIENT)
Dept: RADIOLOGY | Facility: HOSPITAL | Age: 64
Discharge: HOME | End: 2024-11-13
Attending: STUDENT IN AN ORGANIZED HEALTH CARE EDUCATION/TRAINING PROGRAM
Payer: COMMERCIAL

## 2024-11-13 VITALS
OXYGEN SATURATION: 98 % | BODY MASS INDEX: 27.82 KG/M2 | SYSTOLIC BLOOD PRESSURE: 104 MMHG | DIASTOLIC BLOOD PRESSURE: 74 MMHG | HEIGHT: 70 IN | TEMPERATURE: 98 F | HEART RATE: 84 BPM | WEIGHT: 194.31 LBS

## 2024-11-13 DIAGNOSIS — M25.512 ACUTE PAIN OF LEFT SHOULDER: Primary | ICD-10-CM

## 2024-11-13 DIAGNOSIS — M25.512 ACUTE PAIN OF LEFT SHOULDER: ICD-10-CM

## 2024-11-13 PROCEDURE — 3078F DIAST BP <80 MM HG: CPT | Mod: CPTII,S$GLB,, | Performed by: STUDENT IN AN ORGANIZED HEALTH CARE EDUCATION/TRAINING PROGRAM

## 2024-11-13 PROCEDURE — 1159F MED LIST DOCD IN RCRD: CPT | Mod: CPTII,S$GLB,, | Performed by: STUDENT IN AN ORGANIZED HEALTH CARE EDUCATION/TRAINING PROGRAM

## 2024-11-13 PROCEDURE — 73030 X-RAY EXAM OF SHOULDER: CPT | Mod: 26,LT,, | Performed by: RADIOLOGY

## 2024-11-13 PROCEDURE — 99214 OFFICE O/P EST MOD 30 MIN: CPT | Mod: S$GLB,,, | Performed by: STUDENT IN AN ORGANIZED HEALTH CARE EDUCATION/TRAINING PROGRAM

## 2024-11-13 PROCEDURE — 1160F RVW MEDS BY RX/DR IN RCRD: CPT | Mod: CPTII,S$GLB,, | Performed by: STUDENT IN AN ORGANIZED HEALTH CARE EDUCATION/TRAINING PROGRAM

## 2024-11-13 PROCEDURE — 3074F SYST BP LT 130 MM HG: CPT | Mod: CPTII,S$GLB,, | Performed by: STUDENT IN AN ORGANIZED HEALTH CARE EDUCATION/TRAINING PROGRAM

## 2024-11-13 PROCEDURE — 3044F HG A1C LEVEL LT 7.0%: CPT | Mod: CPTII,S$GLB,, | Performed by: STUDENT IN AN ORGANIZED HEALTH CARE EDUCATION/TRAINING PROGRAM

## 2024-11-13 PROCEDURE — 73030 X-RAY EXAM OF SHOULDER: CPT | Mod: TC,FY,PN,LT

## 2024-11-13 PROCEDURE — 3008F BODY MASS INDEX DOCD: CPT | Mod: CPTII,S$GLB,, | Performed by: STUDENT IN AN ORGANIZED HEALTH CARE EDUCATION/TRAINING PROGRAM

## 2024-11-13 NOTE — PROGRESS NOTES
Patient ID: Morgan Marshall III is a 64 y.o. male.     Chief Complaint: Arm Pain    Arm Pain   Pertinent negatives include no chest pain.     History of Present Illness    Morgan presents today for shoulder pain following a shingles vaccination.    He received the second dose of shingles vaccine two weeks ago. Immediately after the injection, he experienced fever, chills, and significant shaking. For two days following vaccination, he had excruciating shoulder pain. The pain initially subsided but recurred yesterday morning, waking him at 3:00 AM. Since then, he reports constant soreness localized to the top of the shoulder. He notes the injection site was high on the shoulder.     He likens the current shoulder pain to post-surgical pain experienced after a previous shoulder surgery. He also compares the severity to that of kidney stones.    Review of Systems  Review of Systems   Constitutional:  Negative for fever.   HENT:  Negative for ear pain and sinus pain.    Eyes:  Negative for discharge.   Respiratory:  Negative for cough and shortness of breath.    Cardiovascular:  Negative for chest pain and leg swelling.   Gastrointestinal:  Negative for diarrhea, nausea and vomiting.   Genitourinary:  Negative for urgency.   Musculoskeletal:  Negative for myalgias.   Skin:  Negative for rash.   Neurological:  Negative for weakness and headaches.   Psychiatric/Behavioral:  Negative for depression.    All other systems reviewed and are negative.      Currently Medications  Current Outpatient Medications on File Prior to Visit   Medication Sig Dispense Refill    atorvastatin (LIPITOR) 40 MG tablet TAKE 1 TABLET BY MOUTH EVERY DAY -- DISCONTINUE PRAVASTATIN 90 tablet 3    famotidine (PEPCID) 40 MG tablet Take 1 tablet (40 mg total) by mouth once daily. 90 tablet 3     No current facility-administered medications on file prior to visit.       Physical  Exam  Vitals:    11/13/24 0919   BP: 104/74   BP Location: Right arm  "  Patient Position: Sitting   Pulse: 84   Temp: 98.2 °F (36.8 °C)   SpO2: 98%   Weight: 88.2 kg (194 lb 5.4 oz)   Height: 5' 10" (1.778 m)      Body mass index is 27.88 kg/m².  Wt Readings from Last 3 Encounters:   11/13/24 88.2 kg (194 lb 5.4 oz)   10/31/24 87.5 kg (192 lb 14.4 oz)   10/04/24 89.1 kg (196 lb 6.9 oz)       Physical Exam  Vitals and nursing note reviewed.   Constitutional:       General: He is not in acute distress.     Appearance: He is not ill-appearing.   HENT:      Head: Normocephalic and atraumatic.      Right Ear: External ear normal.      Left Ear: External ear normal.      Nose: Nose normal.      Mouth/Throat:      Mouth: Mucous membranes are moist.   Eyes:      Extraocular Movements: Extraocular movements intact.      Conjunctiva/sclera: Conjunctivae normal.   Cardiovascular:      Rate and Rhythm: Normal rate and regular rhythm.      Pulses: Normal pulses.      Heart sounds: No murmur heard.  Pulmonary:      Effort: Pulmonary effort is normal. No respiratory distress.      Breath sounds: No wheezing.   Abdominal:      General: There is no distension.      Palpations: Abdomen is soft. There is no mass.      Tenderness: There is no abdominal tenderness.   Musculoskeletal:         General: No swelling.      Left shoulder: No swelling, deformity or effusion. Decreased range of motion (limited due to pain).      Cervical back: Normal range of motion.   Skin:     Coloration: Skin is not jaundiced.      Findings: No rash.   Neurological:      General: No focal deficit present.      Mental Status: He is alert and oriented to person, place, and time.   Psychiatric:         Mood and Affect: Mood normal.         Thought Content: Thought content normal.         Labs:    Complete Blood Count  Lab Results   Component Value Date    RBC 4.76 10/28/2024    HGB 15.3 10/28/2024    HCT 45.8 10/28/2024    MCV 96 10/28/2024    MCH 32.1 (H) 10/28/2024    MCHC 33.4 10/28/2024    RDW 11.9 10/28/2024     " 10/28/2024    MPV 10.4 10/28/2024    GRAN 2.8 10/28/2024    GRAN 51.2 10/28/2024    LYMPH 1.5 10/28/2024    LYMPH 27.0 10/28/2024    MONO 0.6 10/28/2024    MONO 11.7 10/28/2024    EOS 0.4 10/28/2024    BASO 0.09 10/28/2024    EOSINOPHIL 8.2 (H) 10/28/2024    BASOPHIL 1.7 10/28/2024    DIFFMETHOD Automated 10/28/2024       Comprehensive Metabolic Panel  Lab Results   Component Value Date    GLU 93 10/28/2024    BUN 17 10/28/2024    CREATININE 1.06 10/28/2024     10/28/2024    K 5.0 10/28/2024     10/28/2024    PROT 7.8 10/28/2024    ALBUMIN 4.5 10/28/2024    BILITOT 1.0 10/28/2024    AST 30 10/28/2024    ALKPHOS 89 10/28/2024    CO2 29 10/28/2024    ALT 33 10/28/2024    ANIONGAP 4 (L) 10/28/2024       TSH  Lab Results   Component Value Date    TSH 2.330 10/28/2024       Imaging:  X-Ray Shoulder 2 or More Views Left  Narrative: EXAMINATION:  XR SHOULDER COMPLETE 2 OR MORE VIEWS LEFT    CLINICAL HISTORY:  - Pain in left shoulder.    TECHNIQUE:  Left shoulder, three views    COMPARISON:  None    FINDINGS:  No acute fracture or dislocation.  Alignment is satisfactory.  The joint spaces are preserved.  Degenerative disc disease at throughout the lower cervical spine.  Impression: Cervical spondylosis.    Electronically signed by: Luis Bland MD  Date:    11/13/2024  Time:    10:49      Assessment/Plan:  Assessment & Plan    Assessed patient's shoulder pain and systemic symptoms following shingles vaccination 2 weeks ago  Considered possibility of vaccine-related side effects        1. Acute pain of left shoulder  -     MRI Shoulder Without Contrast Left; Future; Expected date: 11/13/2024  -     Cancel: X-Ray Shoulder Left 1 View; Future; Expected date: 11/13/2024         Discussed how to stay healthy including: diet, exercise, refraining from smoking and discussed screening exams / tests needed for age, sex and family Hx.        Avel Soni MD    This note was generated with the assistance of ambient listening  technology. Verbal consent was obtained by the patient and accompanying visitor(s) for the recording of patient appointment to facilitate this note. I attest to having reviewed and edited the generated note for accuracy, though some syntax or spelling errors may persist. Please contact the author of this note for any clarification.

## 2024-11-15 NOTE — PROGRESS NOTES
Subjective:       Patient ID: Morgan Marshall III is a 64 y.o. male.    Chief Complaint: No chief complaint on file.    HPI The patient presented on 10/ 2024   for evaluation of Memory loss.   New issues: none.   Memory: stable.   Behavior / Visual hallucinations / Confusion - no issues.        The originating site (patient location) is: Home.    The distant site (neurologist location) is: Neurology Clinic at [facility].    The chief complaint leading to consultation is: memory difficulties.     Visit type: Virtual visit with synchronous audio and video.    Consent: The patient verbally consented to participating in the video visit and informed that may   decline to receive medical services by telemedicine and may withdraw from such care at any time.    I discussed with the patient the nature of our telemedicine visits, that:    I  would evaluate the patient and recommend diagnostics and treatments based on my assessment.    Our sessions are not being recorded and that personal health information is protected.    Our team would provide follow up care in person if/when the patient needs it.    Virtual (video/telemedicine) visits have significant limitations. A telemedicine exam is primarily focused on the history and what I can observe.   Several critical parts of the neurological exam cannot be performed.     Review of Systems      No complaints.     Current Outpatient Medications:     atorvastatin (LIPITOR) 40 MG tablet, TAKE 1 TABLET BY MOUTH EVERY DAY -- DISCONTINUE PRAVASTATIN, Disp: 90 tablet, Rfl: 3    famotidine (PEPCID) 40 MG tablet, Take 1 tablet (40 mg total) by mouth once daily., Disp: 90 tablet, Rfl: 3    Past Medical History:   Diagnosis Date    Allergy     Hyperlipidemia     Iron deficiency anemia 2015     Past Surgical History:   Procedure Laterality Date    COLONOSCOPY  04/01/2015    due 10 yrs, dr bravo    kidney stones      SINUS SURGERY      SINUS SURGERY  12/13/2018    Septoplasty,  SMR, Destruction Lesion, Maxillary, Ethmoid, Frontal, Sphenoid, Draf I, IGS    TONSILLECTOMY      WISDOM TOOTH EXTRACTION      WRIST SURGERY       Social History     Socioeconomic History    Marital status:    Tobacco Use    Smoking status: Former     Types: Cigarettes     Passive exposure: Past    Smokeless tobacco: Never   Substance and Sexual Activity    Alcohol use: No    Drug use: No     Social Drivers of Health     Financial Resource Strain: Low Risk  (6/10/2024)    Overall Financial Resource Strain (CARDIA)     Difficulty of Paying Living Expenses: Not hard at all   Food Insecurity: No Food Insecurity (6/10/2024)    Hunger Vital Sign     Worried About Running Out of Food in the Last Year: Never true     Ran Out of Food in the Last Year: Never true   Physical Activity: Inactive (6/10/2024)    Exercise Vital Sign     Days of Exercise per Week: 0 days     Minutes of Exercise per Session: 0 min   Stress: No Stress Concern Present (6/10/2024)    [employer] - Occupational Stress Questionnaire     Feeling of Stress : Not at all   Housing Stability: Unknown (6/10/2024)    Housing Stability Vital Sign     Unable to Pay for Housing in the Last Year: No     Past/Current Medical/Surgical History, Past/Current Social History,   Past/Current Family History and Past/Current Medications were reviewed in detail.    Objective:     GENERAL APPEARANCE:     The patient looks comfortable.    No signs of respiratory distress.    Normal breathing pattern.    No dysmorphic features    Normal eye contact.     GENERAL MEDICAL EXAM:    HEENT:  Head is atraumatic normocephalic.      Neck and Axillae: No JVD. No visible lesions.    Cardiopulmonary: No cyanosis. No tachypnea. Normal respiratory effort.    Gastrointestinal/Urogenital:  No jaundice. No stomas or lesions. No visible hernias. No catheters.     Skin, Hair and Nails: No pathognonomic skin rash. No neurofibromatosis. No visible lesions.No  stigmata of autoimmune disease. No clubbing.    Limbs: No varicose veins. No visible swelling.    Muskoskeletal: No visible deformities.No visible lesions.    Neurological Exam    Grossly unchanged Neuro.    Lab Results   Component Value Date    WBC 5.38 10/28/2024    HGB 15.3 10/28/2024    HCT 45.8 10/28/2024    MCV 96 10/28/2024     10/28/2024     Sodium   Date Value Ref Range Status   10/28/2024 140 136 - 145 mmol/L Final     Potassium   Date Value Ref Range Status   10/28/2024 5.0 3.5 - 5.1 mmol/L Final     Chloride   Date Value Ref Range Status   10/28/2024 107 95 - 110 mmol/L Final     CO2   Date Value Ref Range Status   10/28/2024 29 23 - 29 mmol/L Final     Glucose   Date Value Ref Range Status   10/28/2024 93 70 - 110 mg/dL Final     BUN   Date Value Ref Range Status   10/28/2024 17 2 - 20 mg/dL Final     Creatinine   Date Value Ref Range Status   10/28/2024 1.06 0.50 - 1.40 mg/dL Final     Calcium   Date Value Ref Range Status   10/28/2024 9.5 8.7 - 10.5 mg/dL Final     Total Protein   Date Value Ref Range Status   10/28/2024 7.8 6.0 - 8.4 g/dL Final     Albumin   Date Value Ref Range Status   10/28/2024 4.5 3.5 - 5.2 g/dL Final     Total Bilirubin   Date Value Ref Range Status   10/28/2024 1.0 0.1 - 1.0 mg/dL Final     Comment:     For infants and newborns, interpretation of results should be based  on gestational age, weight and in agreement with clinical  observations.    Premature Infant recommended reference ranges:  Up to 24 hours.............<8.0 mg/dL  Up to 48 hours............<12.0 mg/dL  3-5 days..................<15.0 mg/dL  6-29 days.................<15.0 mg/dL       Alkaline Phosphatase   Date Value Ref Range Status   10/28/2024 89 38 - 126 U/L Final     AST   Date Value Ref Range Status   10/28/2024 30 15 - 46 U/L Final     ALT   Date Value Ref Range Status   10/28/2024 33 10 - 44 U/L Final     Anion Gap   Date Value Ref Range Status   10/28/2024 4 (L) 8 - 16 mmol/L Final     eGFR if  "   Date Value Ref Range Status   02/21/2022 >60.0 >60 mL/min/1.73 m^2 Final     eGFR if non    Date Value Ref Range Status   02/21/2022 57.6 (A) >60 mL/min/1.73 m^2 Final     Comment:     Calculation used to obtain the estimated glomerular filtration  rate (eGFR) is the CKD-EPI equation.        No results found for: "MDEZFNIX19"    Lab Results   Component Value Date    TSH 2.330 10/28/2024     No results found in the last 24 hours.    LABORATORY EVALUATION    RADIOLOGY EVALUATION     NEUROPHYSIOLOGY EVALUATION     PATHOLOGY EVALUATION      NEUROCOGNITIVE AND NEUROPSYCHOLOGY EVALUATION     Reviewed the neuroimaging independently     MOCA: 29/ 30 - missed 1 word out of 5.    Assessment:   64 Years old C.  Male with PMHX as above came of the evaluation of " Memory difficulties ".   - Family Hx of Alzheimer's Dementia.   - TRACY.   Plan:   Doing " really well " / no complaints as per patient.   Discussed to  do MRI Brain - He has the risk for CVA due to TRACY.   ATN profile: WNP.      Neuropsychological Assessment: 07/ 2024 - Neuropsychological testing was within normal limits.   He does not meet criteria for a cognitive diagnosis.   Mild encoding and planning inefficiencies were observed on testing, but nothing else of note.   He may benefit from completing work-up and/or referral to neurology if he remains concerned about his cognition.      Saw sleep clinic - using a mouth piece / He is comfortable with it / He feels rested in the AM /   no needs for naps / no EDS / no waking up in the middle of the night " gasping for air ".      Labs: CBC / CMP / TSH / Hgb A 1 C / Lipids panel - 2024 - non significant abnormalities.    MEDICAL/SURGICAL COMORBIDITIES     All relevant medical comorbidities noted and managed by primary care physician and medical care team.      HEALTHY LIFESTYLE AND PREVENTATIVE CARE    The patient to adhere to the age-appropriate health maintenance guidelines including " screening tests and vaccinations.   The patient to adhere to  healthy lifestyle, optimal weight, exercise, healthy diet,   good sleep hygiene and avoiding drugs including smoking, alcohol and recreational drugs.    RTC 4 weeks.     I spent a total of 30 minutes on the day of the visit.This includes face to face time and non-face to face time preparing to see the patient (eg, review of tests), obtaining and/or reviewing separately obtained history, documenting clinical information in the electronic or other health record, independently interpreting results and communicating results to the patient/family/caregiver, or care coordinator.    Please do not hesitate to contact me with any updates, questions or concerns.    Isaiah Vaca MD.  General Neurologist.

## 2024-11-18 ENCOUNTER — OFFICE VISIT (OUTPATIENT)
Dept: NEUROLOGY | Facility: CLINIC | Age: 64
End: 2024-11-18
Payer: COMMERCIAL

## 2024-11-18 ENCOUNTER — TELEPHONE (OUTPATIENT)
Dept: NEUROLOGY | Facility: CLINIC | Age: 64
End: 2024-11-18

## 2024-11-18 DIAGNOSIS — G47.33 OSA (OBSTRUCTIVE SLEEP APNEA): ICD-10-CM

## 2024-11-18 DIAGNOSIS — R41.3 OTHER AMNESIA: ICD-10-CM

## 2024-11-18 DIAGNOSIS — Z82.0 FAMILY HISTORY OF ALZHEIMER DISEASE: Primary | ICD-10-CM

## 2024-11-18 PROCEDURE — 3044F HG A1C LEVEL LT 7.0%: CPT | Mod: CPTII,95,, | Performed by: PSYCHIATRY & NEUROLOGY

## 2024-11-18 PROCEDURE — 99214 OFFICE O/P EST MOD 30 MIN: CPT | Mod: 95,,, | Performed by: PSYCHIATRY & NEUROLOGY

## 2024-11-18 PROCEDURE — 1159F MED LIST DOCD IN RCRD: CPT | Mod: CPTII,95,, | Performed by: PSYCHIATRY & NEUROLOGY

## 2024-11-18 PROCEDURE — 1160F RVW MEDS BY RX/DR IN RCRD: CPT | Mod: CPTII,95,, | Performed by: PSYCHIATRY & NEUROLOGY

## 2024-11-19 ENCOUNTER — HOSPITAL ENCOUNTER (OUTPATIENT)
Dept: RADIOLOGY | Facility: HOSPITAL | Age: 64
Discharge: HOME | End: 2024-11-19
Attending: STUDENT IN AN ORGANIZED HEALTH CARE EDUCATION/TRAINING PROGRAM
Payer: COMMERCIAL

## 2024-11-19 ENCOUNTER — PATIENT MESSAGE (OUTPATIENT)
Dept: NEUROLOGY | Facility: CLINIC | Age: 64
End: 2024-11-19
Payer: COMMERCIAL

## 2024-11-19 ENCOUNTER — HOSPITAL ENCOUNTER (OUTPATIENT)
Dept: RADIOLOGY | Facility: HOSPITAL | Age: 64
Discharge: HOME | End: 2024-11-19
Attending: PSYCHIATRY & NEUROLOGY
Payer: COMMERCIAL

## 2024-11-19 DIAGNOSIS — G47.33 OSA (OBSTRUCTIVE SLEEP APNEA): ICD-10-CM

## 2024-11-19 DIAGNOSIS — Z82.0 FAMILY HISTORY OF ALZHEIMER DISEASE: ICD-10-CM

## 2024-11-19 DIAGNOSIS — M25.512 ACUTE PAIN OF LEFT SHOULDER: ICD-10-CM

## 2024-11-19 DIAGNOSIS — R41.3 OTHER AMNESIA: ICD-10-CM

## 2024-11-19 PROCEDURE — 70551 MRI BRAIN STEM W/O DYE: CPT | Mod: TC,PN

## 2024-11-19 PROCEDURE — 70551 MRI BRAIN STEM W/O DYE: CPT | Mod: 26,,, | Performed by: RADIOLOGY

## 2024-11-19 PROCEDURE — 73221 MRI JOINT UPR EXTREM W/O DYE: CPT | Mod: 26,LT,, | Performed by: RADIOLOGY

## 2024-11-19 PROCEDURE — 73221 MRI JOINT UPR EXTREM W/O DYE: CPT | Mod: TC,PN,LT

## 2024-11-20 ENCOUNTER — PATIENT MESSAGE (OUTPATIENT)
Dept: FAMILY MEDICINE | Facility: CLINIC | Age: 64
End: 2024-11-20
Payer: COMMERCIAL

## 2024-11-20 DIAGNOSIS — M25.512 ACUTE PAIN OF LEFT SHOULDER: Primary | ICD-10-CM

## 2024-11-25 ENCOUNTER — TELEPHONE (OUTPATIENT)
Dept: FAMILY MEDICINE | Facility: CLINIC | Age: 64
End: 2024-11-25
Payer: COMMERCIAL

## 2024-12-14 NOTE — PROGRESS NOTES
Subjective:       Patient ID: Morgan Marshall III is a 64 y.o. male.    Chief Complaint: No chief complaint on file.      HPI The patient presented on 11/ 2024 for evaluation of Memory loss.   New issues: None.   Memory: stable.   Behavior / Visual hallucinations / Confusion - no an issue.   Sleeping: well with mouth piece / refresh next morning / no naps needed.      The originating site (patient location) is: Home.    The distant site (neurologist location) is: Neurology Clinic at [facility].    The chief complaint leading to consultation is: Memory difficulties.     Visit type: Virtual visit with synchronous audio and video.    Consent: The patient verbally consented to participating in the video visit and informed that may   decline to receive medical services by telemedicine and may withdraw from such care at any time.    I discussed with the patient the nature of our telemedicine visits, that:    I  would evaluate the patient and recommend diagnostics and treatments based on my assessment.    Our sessions are not being recorded and that personal health information is protected.    Our team would provide follow up care in person if/when the patient needs it.    Virtual (video/telemedicine) visits have significant limitations. A telemedicine exam is primarily focused on the history and what I can observe.   Several critical parts of the neurological exam cannot be performed.     Review of Systems      No complaints.     Current Outpatient Medications:     atorvastatin (LIPITOR) 40 MG tablet, TAKE 1 TABLET BY MOUTH EVERY DAY -- DISCONTINUE PRAVASTATIN, Disp: 90 tablet, Rfl: 3    famotidine (PEPCID) 40 MG tablet, Take 1 tablet (40 mg total) by mouth once daily., Disp: 90 tablet, Rfl: 3    Past Medical History:   Diagnosis Date    Allergy     Hyperlipidemia     Iron deficiency anemia 2015       Past Surgical History:   Procedure Laterality Date    COLONOSCOPY  04/01/2015    due 10 yrs, dr bravo     kidney stones      SINUS SURGERY      SINUS SURGERY  12/13/2018    Septoplasty, SMR, Destruction Lesion, Maxillary, Ethmoid, Frontal, Sphenoid, Draf I, IGS    TONSILLECTOMY      WISDOM TOOTH EXTRACTION      WRIST SURGERY         Social History     Socioeconomic History    Marital status:    Tobacco Use    Smoking status: Former     Types: Cigarettes     Passive exposure: Past    Smokeless tobacco: Never   Substance and Sexual Activity    Alcohol use: No    Drug use: No     Social Drivers of Health     Financial Resource Strain: Low Risk  (6/10/2024)    Overall Financial Resource Strain (CARDIA)     Difficulty of Paying Living Expenses: Not hard at all   Food Insecurity: No Food Insecurity (6/10/2024)    Hunger Vital Sign     Worried About Running Out of Food in the Last Year: Never true     Ran Out of Food in the Last Year: Never true   Physical Activity: Inactive (6/10/2024)    Exercise Vital Sign     Days of Exercise per Week: 0 days     Minutes of Exercise per Session: 0 min   Stress: No Stress Concern Present (6/10/2024)    [employer] - Occupational Stress Questionnaire     Feeling of Stress : Not at all   Housing Stability: Unknown (6/10/2024)    Housing Stability Vital Sign     Unable to Pay for Housing in the Last Year: No     Past/Current Medical/Surgical History, Past/Current Social History,   Past/Current Family History and Past/Current Medications were reviewed in detail.    Objective:     GENERAL APPEARANCE:     The patient looks comfortable.    No signs of respiratory distress.    Normal breathing pattern.    No dysmorphic features    Normal eye contact.     GENERAL MEDICAL EXAM:    HEENT:  Head is atraumatic normocephalic.      Neck and Axillae: No JVD. No visible lesions.    Cardiopulmonary: No cyanosis. No tachypnea. Normal respiratory effort.    Gastrointestinal/Urogenital:  No jaundice. No stomas or lesions. No visible hernias. No catheters.     Skin, Hair and  Nails: No pathognonomic skin rash. No neurofibromatosis.   No visible lesions.No stigmata of autoimmune disease. No clubbing.    Limbs: No varicose veins. No visible swelling.    Muskoskeletal: No visible deformities.No visible lesions.    Neurological Exam    Grossly intact Neuro.    Lab Results   Component Value Date    WBC 5.38 10/28/2024    HGB 15.3 10/28/2024    HCT 45.8 10/28/2024    MCV 96 10/28/2024     10/28/2024     Sodium   Date Value Ref Range Status   10/28/2024 140 136 - 145 mmol/L Final     Potassium   Date Value Ref Range Status   10/28/2024 5.0 3.5 - 5.1 mmol/L Final     Chloride   Date Value Ref Range Status   10/28/2024 107 95 - 110 mmol/L Final     CO2   Date Value Ref Range Status   10/28/2024 29 23 - 29 mmol/L Final     Glucose   Date Value Ref Range Status   10/28/2024 93 70 - 110 mg/dL Final     BUN   Date Value Ref Range Status   10/28/2024 17 2 - 20 mg/dL Final     Creatinine   Date Value Ref Range Status   10/28/2024 1.06 0.50 - 1.40 mg/dL Final     Calcium   Date Value Ref Range Status   10/28/2024 9.5 8.7 - 10.5 mg/dL Final     Total Protein   Date Value Ref Range Status   10/28/2024 7.8 6.0 - 8.4 g/dL Final     Albumin   Date Value Ref Range Status   10/28/2024 4.5 3.5 - 5.2 g/dL Final     Total Bilirubin   Date Value Ref Range Status   10/28/2024 1.0 0.1 - 1.0 mg/dL Final     Comment:     For infants and newborns, interpretation of results should be based  on gestational age, weight and in agreement with clinical  observations.    Premature Infant recommended reference ranges:  Up to 24 hours.............<8.0 mg/dL  Up to 48 hours............<12.0 mg/dL  3-5 days..................<15.0 mg/dL  6-29 days.................<15.0 mg/dL       Alkaline Phosphatase   Date Value Ref Range Status   10/28/2024 89 38 - 126 U/L Final     AST   Date Value Ref Range Status   10/28/2024 30 15 - 46 U/L Final     ALT   Date Value Ref Range Status   10/28/2024 33 10 - 44 U/L Final     Anion Gap  "  Date Value Ref Range Status   10/28/2024 4 (L) 8 - 16 mmol/L Final     eGFR if    Date Value Ref Range Status   02/21/2022 >60.0 >60 mL/min/1.73 m^2 Final     eGFR if non    Date Value Ref Range Status   02/21/2022 57.6 (A) >60 mL/min/1.73 m^2 Final     Comment:     Calculation used to obtain the estimated glomerular filtration  rate (eGFR) is the CKD-EPI equation.        No results found for: "MILWITRA35"    Lab Results   Component Value Date    TSH 2.330 10/28/2024     No results found in the last 24 hours.    LABORATORY EVALUATION    RADIOLOGY EVALUATION     NEUROPHYSIOLOGY EVALUATION     PATHOLOGY EVALUATION      NEUROCOGNITIVE AND NEUROPSYCHOLOGY EVALUATION     Reviewed the neuroimaging independently     MOCA: 29/ 30 - missed 1 word out of 5.    Assessment:   64 Years old C.  Male with PMHX as above came of the evaluation of " Memory difficulties ".   - Family Hx of Alzheimer's Dementia.   - TRACY.   Plan:   Continue doing well.   I had a long conversation with Patient - He is mostly concerns because the dementia issues   in his family, at this moment in time I do not believe He has any signs of MCI / early Dementia   Seems to me his cognitive issues are or were related to his TRACY issues and the improvement is attach   to use of mouth piece and better night sleep.    We will do CUS.   Discussed labs results.     Discussed to  do MRI Brain - He has the risk for CVA due to TRACY.   ATN profile: WNP.      Neuropsychological Assessment: 07/ 2024 - Neuropsychological testing was within normal limits.   He does not meet criteria for a cognitive diagnosis.   Mild encoding and planning inefficiencies were observed on testing, but nothing else of note.   He may benefit from completing work-up and/or referral to neurology if he remains concerned about his cognition.      Saw sleep clinic - using a mouth piece / He is comfortable with it / He feels rested in the AM /   no needs for naps / no " "EDS / no waking up in the middle of the night " gasping for air ".      Labs: CBC / CMP / TSH / Hgb A 1 C / Lipids panel - 2024 - non significant abnormalities.    MEDICAL/SURGICAL COMORBIDITIES     All relevant medical comorbidities noted and managed by primary care physician and medical care team.      HEALTHY LIFESTYLE AND PREVENTATIVE CARE    The patient to adhere to the age-appropriate health maintenance guidelines including screening tests and vaccinations.   The patient to adhere to  healthy lifestyle, optimal weight, exercise, healthy diet,   good sleep hygiene and avoiding drugs including smoking, alcohol and recreational drugs.    RTC: 6 months.      I spent a total of 25 minutes on the day of the visit.This includes face to face time and non-face to face time preparing to see the patient (eg, review of tests),   obtaining and/or reviewing separately obtained history, documenting clinical information in the electronic or other health record,   independently interpreting results and communicating results to the patient/family/caregiver, or care coordinator.    Please do not hesitate to contact me with any updates, questions or concerns.    Isaiah aVca MD.  General Neurologist.   "

## 2024-12-18 ENCOUNTER — PATIENT MESSAGE (OUTPATIENT)
Dept: CARDIOLOGY | Facility: HOSPITAL | Age: 64
End: 2024-12-18
Payer: COMMERCIAL

## 2024-12-18 ENCOUNTER — OFFICE VISIT (OUTPATIENT)
Dept: NEUROLOGY | Facility: CLINIC | Age: 64
End: 2024-12-18
Payer: COMMERCIAL

## 2024-12-18 ENCOUNTER — TELEPHONE (OUTPATIENT)
Dept: NEUROLOGY | Facility: CLINIC | Age: 64
End: 2024-12-18

## 2024-12-18 DIAGNOSIS — Z82.0 FAMILY HISTORY OF ALZHEIMER DISEASE: ICD-10-CM

## 2024-12-18 DIAGNOSIS — I65.23 CAROTID STENOSIS, BILATERAL: Primary | ICD-10-CM

## 2024-12-18 DIAGNOSIS — G47.33 OSA (OBSTRUCTIVE SLEEP APNEA): ICD-10-CM

## 2024-12-18 PROCEDURE — 3044F HG A1C LEVEL LT 7.0%: CPT | Mod: CPTII,95,, | Performed by: PSYCHIATRY & NEUROLOGY

## 2024-12-18 PROCEDURE — 1159F MED LIST DOCD IN RCRD: CPT | Mod: CPTII,95,, | Performed by: PSYCHIATRY & NEUROLOGY

## 2024-12-18 PROCEDURE — 1160F RVW MEDS BY RX/DR IN RCRD: CPT | Mod: CPTII,95,, | Performed by: PSYCHIATRY & NEUROLOGY

## 2024-12-18 PROCEDURE — 99213 OFFICE O/P EST LOW 20 MIN: CPT | Mod: 95,,, | Performed by: PSYCHIATRY & NEUROLOGY

## 2025-01-17 ENCOUNTER — HOSPITAL ENCOUNTER (OUTPATIENT)
Dept: CARDIOLOGY | Facility: HOSPITAL | Age: 65
Discharge: HOME | End: 2025-01-17
Attending: PSYCHIATRY & NEUROLOGY
Payer: COMMERCIAL

## 2025-01-17 VITALS
HEIGHT: 70 IN | DIASTOLIC BLOOD PRESSURE: 85 MMHG | SYSTOLIC BLOOD PRESSURE: 142 MMHG | WEIGHT: 194 LBS | BODY MASS INDEX: 27.77 KG/M2

## 2025-01-17 DIAGNOSIS — I65.23 CAROTID STENOSIS, BILATERAL: ICD-10-CM

## 2025-01-17 LAB
LEFT ARM DIASTOLIC BLOOD PRESSURE: 85 MMHG
LEFT ARM SYSTOLIC BLOOD PRESSURE: 142 MMHG
LEFT CBA DIAS: 26 CM/S
LEFT CBA SYS: 95 CM/S
LEFT CCA DIST DIAS: 29 CM/S
LEFT CCA DIST SYS: 114 CM/S
LEFT CCA MID DIAS: 27 CM/S
LEFT CCA MID SYS: 128 CM/S
LEFT CCA PROX DIAS: 27 CM/S
LEFT CCA PROX SYS: 157 CM/S
LEFT ECA DIAS: 24 CM/S
LEFT ECA SYS: 144 CM/S
LEFT ICA DIST DIAS: 36 CM/S
LEFT ICA DIST SYS: 95 CM/S
LEFT ICA MID DIAS: 28 CM/S
LEFT ICA MID SYS: 79 CM/S
LEFT ICA PROX DIAS: 30 CM/S
LEFT ICA PROX SYS: 91 CM/S
LEFT VERTEBRAL DIAS: 16 CM/S
LEFT VERTEBRAL SYS: 60 CM/S
OHS CV CAROTID RIGHT ICA EDV HIGHEST: 32
OHS CV CAROTID ULTRASOUND LEFT ICA/CCA RATIO: 0.83
OHS CV CAROTID ULTRASOUND RIGHT ICA/CCA RATIO: 0.79
OHS CV PV CAROTID LEFT HIGHEST CCA: 157
OHS CV PV CAROTID LEFT HIGHEST ICA: 95
OHS CV PV CAROTID RIGHT HIGHEST CCA: 122
OHS CV PV CAROTID RIGHT HIGHEST ICA: 89
OHS CV US CAROTID LEFT HIGHEST EDV: 36
RIGHT ARM DIASTOLIC BLOOD PRESSURE: 74 MMHG
RIGHT ARM SYSTOLIC BLOOD PRESSURE: 122 MMHG
RIGHT CBA DIAS: 20 CM/S
RIGHT CBA SYS: 66 CM/S
RIGHT CCA DIST DIAS: 25 CM/S
RIGHT CCA DIST SYS: 112 CM/S
RIGHT CCA MID DIAS: 25 CM/S
RIGHT CCA MID SYS: 122 CM/S
RIGHT CCA PROX DIAS: 14 CM/S
RIGHT CCA PROX SYS: 110 CM/S
RIGHT ECA DIAS: 20 CM/S
RIGHT ECA SYS: 117 CM/S
RIGHT ICA DIST DIAS: 27 CM/S
RIGHT ICA DIST SYS: 84 CM/S
RIGHT ICA MID DIAS: 24 CM/S
RIGHT ICA MID SYS: 77 CM/S
RIGHT ICA PROX DIAS: 32 CM/S
RIGHT ICA PROX SYS: 89 CM/S
RIGHT VERTEBRAL DIAS: 14 CM/S
RIGHT VERTEBRAL SYS: 50 CM/S

## 2025-01-17 PROCEDURE — 93880 EXTRACRANIAL BILAT STUDY: CPT | Mod: 26,,, | Performed by: INTERNAL MEDICINE

## 2025-01-17 PROCEDURE — 93880 EXTRACRANIAL BILAT STUDY: CPT

## 2025-02-17 ENCOUNTER — TELEPHONE (OUTPATIENT)
Dept: PHARMACY | Facility: CLINIC | Age: 65
End: 2025-02-17
Payer: COMMERCIAL

## 2025-02-17 NOTE — TELEPHONE ENCOUNTER
[facility] Chart Review & Patient Outreach Details For Medication Adherence Project    Reason for Outreach Encounter: 3rd Party payor non-compliance report ([employer], [employer], C, etc)  2.  Patient Outreach Method: Click4Carehart message  3.   Medication in question: atorvastatin   LAST FILLED: 10/31/24 for 90 day supply  Hyperlipidemia Medications              atorvastatin (LIPITOR) 40 MG tablet TAKE 1 TABLET BY MOUTH EVERY DAY -- DISCONTINUE PRAVASTATIN               4.  Reviewed and or Updates Made To: Patient Chart  5. Outreach Outcomes and/or actions taken: Sent inquiry to patient: Waiting for response.

## 2025-04-17 ENCOUNTER — TELEPHONE (OUTPATIENT)
Dept: PHARMACY | Facility: CLINIC | Age: 65
End: 2025-04-17
Payer: COMMERCIAL

## 2025-04-17 NOTE — TELEPHONE ENCOUNTER
[facility] Chart Review & Patient Outreach Details For Medication Adherence Project    Reason for Outreach Encounter: 3rd Party payor non-compliance report ([employer], [employer], C, etc)  2.  Patient Outreach Method: IQuumhart message  3.   Medication in question: atorvastatin   LAST FILLED: 10/31/24 for 90 day supply  Hyperlipidemia Medications              atorvastatin (LIPITOR) 40 MG tablet TAKE 1 TABLET BY MOUTH EVERY DAY -- DISCONTINUE PRAVASTATIN               4.  Reviewed and or Updates Made To: Patient Chart  5. Outreach Outcomes and/or actions taken: Sent inquiry to patient: Waiting for response; pt states he is taking atorvastatin 40mg every other day, sent message to provider to confirm dosing

## 2025-04-21 RX ORDER — FAMOTIDINE 40 MG/1
40 TABLET, FILM COATED ORAL DAILY
Qty: 90 TABLET | Refills: 3 | Status: SHIPPED | OUTPATIENT
Start: 2025-04-21 | End: 2026-04-21

## 2025-04-21 NOTE — TELEPHONE ENCOUNTER
No care due was identified.  [facility] Embedded Care Due Messages. Reference number: [id number].   4/21/2025 8:18:56 AM CDT

## 2025-05-24 ENCOUNTER — TELEPHONE (OUTPATIENT)
Dept: PHARMACY | Facility: CLINIC | Age: 65
End: 2025-05-24
Payer: COMMERCIAL

## 2025-05-24 NOTE — TELEPHONE ENCOUNTER
[facility] Chart Review & Patient Outreach Details For Medication Adherence Project    Reason for Outreach Encounter: 3rd Party payor non-compliance report ([employer], [employer], C, etc)  2.  Patient Outreach Method: Zazengohart message  3.   Medication in question: atorvastatin   LAST FILLED: 10/31/24 for 90 day supply  Hyperlipidemia Medications              atorvastatin (LIPITOR) 40 MG tablet TAKE 1 TABLET BY MOUTH EVERY DAY -- DISCONTINUE PRAVASTATIN               4.  Reviewed and or Updates Made To: Patient Chart  5. Outreach Outcomes and/or actions taken: Sent inquiry to patient: Waiting for response.

## 2025-06-03 ENCOUNTER — PATIENT MESSAGE (OUTPATIENT)
Dept: FAMILY MEDICINE | Facility: CLINIC | Age: 65
End: 2025-06-03
Payer: COMMERCIAL

## 2025-06-03 DIAGNOSIS — R68.89 EXERCISE INTOLERANCE: ICD-10-CM

## 2025-06-03 DIAGNOSIS — R63.5 WEIGHT GAIN: ICD-10-CM

## 2025-06-03 DIAGNOSIS — R73.9 HYPERGLYCEMIA: Primary | ICD-10-CM

## 2025-06-03 DIAGNOSIS — L74.9 SWEATING ABNORMALITY: ICD-10-CM

## 2025-06-03 DIAGNOSIS — R41.3 MEMORY LOSS: ICD-10-CM

## 2025-06-03 DIAGNOSIS — M79.2 NEURALGIA: ICD-10-CM

## 2025-06-03 DIAGNOSIS — R53.83 FATIGUE, UNSPECIFIED TYPE: ICD-10-CM

## 2025-06-09 RX ORDER — ATORVASTATIN CALCIUM 40 MG/1
TABLET, FILM COATED ORAL
Qty: 45 TABLET | Refills: 3 | Status: SHIPPED | OUTPATIENT
Start: 2025-06-09

## 2025-06-09 NOTE — TELEPHONE ENCOUNTER
Dolly Rene, Thompson MENA MD,  my name is Nayla Tong and I am a pharmacist with [facility]. Part of my job as a pharmacist at [facility] is to perform medication therapy management which includes reviewing the reports that [facility] sends us that show the dates of the last time any diabetic, cholesterol and certain high blood pressure medications were filled and checking with the patients to see if they are in need of any refills to be sent to their pharmacy.     Mr. Marshall states they would like this prescription refilled AND THAT THEY ONLY TAKE IT EVERY OTHER DAY so I have pended it for your review and approval. Our goal is to make sure patients stay adherent and do not miss any days of therapy due to delayed refills. Please review and take action as you see fit. Thank you.

## 2025-06-09 NOTE — TELEPHONE ENCOUNTER
No care due was identified.  [facility] Embedded Care Due Messages. Reference number: [id number].   6/09/2025 7:03:32 AM CDT

## 2025-06-11 ENCOUNTER — TELEPHONE (OUTPATIENT)
Dept: ENDOSCOPY | Facility: HOSPITAL | Age: 65
End: 2025-06-11
Payer: COMMERCIAL

## 2025-06-11 ENCOUNTER — CLINICAL SUPPORT (OUTPATIENT)
Dept: ENDOSCOPY | Facility: HOSPITAL | Age: 65
End: 2025-06-11
Attending: FAMILY MEDICINE
Payer: COMMERCIAL

## 2025-06-11 VITALS — WEIGHT: 202 LBS | BODY MASS INDEX: 28.98 KG/M2

## 2025-06-11 DIAGNOSIS — Z12.12 ENCOUNTER FOR COLORECTAL CANCER SCREENING: ICD-10-CM

## 2025-06-11 DIAGNOSIS — Z12.11 ENCOUNTER FOR COLORECTAL CANCER SCREENING: ICD-10-CM

## 2025-06-11 NOTE — PLAN OF CARE
Contacted patient to schedule Colonoscopy.  Patient stated he preferred to do this at [place] since it's closer to his home.  Phone number for that location was sent via portal to patient.

## 2025-06-11 NOTE — TELEPHONE ENCOUNTER
Contacted patient to schedule Colonoscopy.  Patient stated he preferred to do this at [place] since it's closer to his home.  Phone number for that location was sent via portal to patient.

## 2025-06-11 NOTE — PLAN OF CARE
Contacted patient to schedule Colonoscopy.  Patient stated he preferred to do this at [place] since it's closer to his home.  Phone number for that location was sent via portal to patient.

## 2025-06-14 NOTE — PROGRESS NOTES
"Subjective:       Patient ID: Morgan Marshall III is a 64 y.o. male.    Chief Complaint: No chief complaint on file.    HPI The patient presented on 2024 for evaluation of Memory loss.   New issues: none cognitive wise.   Memory: stable.   Behavior / Visual hallucinations / Confusion - no an issue.   Sleepin to 8 hours.   He indicated has been having episodes of intermittent / transient " sweating / legs tingling / SOB " that last   for 15 to 20 mins for the last 2 to 3 years / comes every 4 to 5 weeks. Possible trigger by fasting / denied Anxiety issues /  No palpitation / no CP or dizziness.     The originating site (patient location) is: Home.    The distant site (neurologist location) is: Neurology Clinic at [facility].    The chief complaint leading to consultation is: Memory Loss.     Visit type: Virtual visit with synchronous audio and video.    Consent: The patient verbally consented to participating in the video visit and informed that may   decline to receive medical services by telemedicine and may withdraw from such care at any time.    I discussed with the patient the nature of our telemedicine visits, that:    I  would evaluate the patient and recommend diagnostics and treatments based on my assessment.    Our sessions are not being recorded and that personal health information is protected.    Our team would provide follow up care in person if/when the patient needs it.    Virtual (video/telemedicine) visits have significant limitations.   A telemedicine exam is primarily focused on the history and what I can observe.   Several critical parts of the neurological exam cannot be performed.     Review of Systems      Spells of transient dizziness.    Current Medications[1]    Past Medical History:   Diagnosis Date    Allergy     Hyperlipidemia     Iron deficiency anemia      Past Surgical History:   Procedure Laterality Date    COLONOSCOPY  2015    due 10 yrs, dr bravo    " kidney stones      SINUS SURGERY      SINUS SURGERY  12/13/2018    Septoplasty, SMR, Destruction Lesion, Maxillary, Ethmoid, Frontal, Sphenoid, Draf I, IGS    TONSILLECTOMY      WISDOM TOOTH EXTRACTION      WRIST SURGERY       Social History[2]    Past/Current Medical/Surgical History, Past/Current Social History,   Past/Current Family History and Past/Current Medications were reviewed in detail.    Objective:     GENERAL APPEARANCE:     The patient looks comfortable.    No signs of respiratory distress.    Normal breathing pattern.    No dysmorphic features    Normal eye contact.     GENERAL MEDICAL EXAM:    HEENT:  Head is atraumatic normocephalic.      Neck and Axillae: No JVD. No visible lesions.    Cardiopulmonary: No cyanosis. No tachypnea. Normal respiratory effort.    Gastrointestinal/Urogenital:  No jaundice. No stomas or lesions. No visible hernias. No catheters.     Skin, Hair and Nails: No pathognonomic skin rash. No neurofibromatosis. No visible lesions.  No stigmata of autoimmune disease. No clubbing.    Limbs: No varicose veins. No visible swelling.    Muskoskeletal: No visible deformities.No visible lesions.    Neurological Exam    Grossly unchanged Neuro.    Lab Results   Component Value Date    WBC 5.38 10/28/2024    HGB 15.3 10/28/2024    HCT 45.8 10/28/2024    MCV 96 10/28/2024     10/28/2024     Sodium   Date Value Ref Range Status   10/28/2024 140 136 - 145 mmol/L Final     Potassium   Date Value Ref Range Status   10/28/2024 5.0 3.5 - 5.1 mmol/L Final     Chloride   Date Value Ref Range Status   10/28/2024 107 95 - 110 mmol/L Final     CO2   Date Value Ref Range Status   10/28/2024 29 23 - 29 mmol/L Final     Glucose   Date Value Ref Range Status   10/28/2024 93 70 - 110 mg/dL Final     BUN   Date Value Ref Range Status   10/28/2024 17 2 - 20 mg/dL Final     Creatinine   Date Value Ref Range Status   10/28/2024 1.06 0.50 - 1.40 mg/dL Final     Calcium   Date Value Ref Range Status  "  10/28/2024 9.5 8.7 - 10.5 mg/dL Final     Total Protein   Date Value Ref Range Status   10/28/2024 7.8 6.0 - 8.4 g/dL Final     Albumin   Date Value Ref Range Status   10/28/2024 4.5 3.5 - 5.2 g/dL Final     Total Bilirubin   Date Value Ref Range Status   10/28/2024 1.0 0.1 - 1.0 mg/dL Final     Comment:     For infants and newborns, interpretation of results should be based  on gestational age, weight and in agreement with clinical  observations.    Premature Infant recommended reference ranges:  Up to 24 hours.............<8.0 mg/dL  Up to 48 hours............<12.0 mg/dL  3-5 days..................<15.0 mg/dL  6-29 days.................<15.0 mg/dL       Alkaline Phosphatase   Date Value Ref Range Status   10/28/2024 89 38 - 126 U/L Final     AST   Date Value Ref Range Status   10/28/2024 30 15 - 46 U/L Final     ALT   Date Value Ref Range Status   10/28/2024 33 10 - 44 U/L Final     Anion Gap   Date Value Ref Range Status   10/28/2024 4 (L) 8 - 16 mmol/L Final     eGFR if    Date Value Ref Range Status   02/21/2022 >60.0 >60 mL/min/1.73 m^2 Final     eGFR if non    Date Value Ref Range Status   02/21/2022 57.6 (A) >60 mL/min/1.73 m^2 Final     Comment:     Calculation used to obtain the estimated glomerular filtration  rate (eGFR) is the CKD-EPI equation.        No results found for: "[id number]"    Lab Results   Component Value Date    TSH 2.330 10/28/2024     No results found in the last 24 hours.    LABORATORY EVALUATION    RADIOLOGY EVALUATION     NEUROPHYSIOLOGY EVALUATION     PATHOLOGY EVALUATION      NEUROCOGNITIVE AND NEUROPSYCHOLOGY EVALUATION     Reviewed the neuroimaging independently     MOCA: 29/ 30 - missed 1 word out of 5.    Assessment:   64 Years old C.  Male with PMHX as above came of the evaluation of " Memory difficulties ".   - Family Hx of Alzheimer's Dementia.   - TRACY.   Plan:   Patient doing well cognitive wise, memory stable - no major issues.   He will " "come to the office next visit for follow up MOCA test.     Spells described above - Patient discussed with PCP.  We recommended to go to the closes UR with the next episode.  Set up appointment with Cardiology service for ambulatory Cardiac moitoring.     CUS: 12/ 2024 - < 50 % stenosis.    Discussed to  do MRI Brain - He has the risk for CVA due to TRACY.   ATN profile: WNP.      Neuropsychological Assessment: 07/ 2024 - Neuropsychological testing was within normal limits.   He does not meet criteria for a cognitive diagnosis.   Mild encoding and planning inefficiencies were observed on testing, but nothing else of note.   He may benefit from completing work-up and/or referral to neurology if he remains concerned about his cognition.      Saw sleep clinic - using a mouth piece / He is comfortable with it / He feels rested in the AM /   no needs for naps / no EDS / no waking up in the middle of the night " gasping for air ".      Labs: CBC / CMP / TSH / Hgb A 1 C / Lipids panel / PSA  - 2024 - non significant abnormalities.    MRI Brain - 2024 - no acute changes.     MEDICAL/SURGICAL COMORBIDITIES     All relevant medical comorbidities noted and managed by primary care physician and medical care team.      HEALTHY LIFESTYLE AND PREVENTATIVE CARE    The patient to adhere to the age-appropriate health maintenance guidelines including screening tests and vaccinations.   The patient to adhere to  healthy lifestyle, optimal weight, exercise, healthy diet,   good sleep hygiene and avoiding drugs including smoking, alcohol and recreational drugs.    RTC: 3 months.      I spent a total of 20 minutes on the day of the visit.This includes face to face time and non-face to face time preparing to see the patient (eg, review of tests),   obtaining and/or reviewing separately obtained history, documenting clinical information in the electronic or other health record,   independently interpreting results and communicating results to " the patient/family/caregiver, or care coordinator.    Please do not hesitate to contact me with any updates, questions or concerns.    Isaiah Vaca MD.  General Neurologist.     The patient location is: [place]  The chief complaint leading to consultation is: Memory difficulties.     Visit type: audiovisual    Face to Face time with patient: Yes.   20 minutes of total time spent on the encounter, which includes face to face time and non-face to face time preparing to see the patient (eg, review of tests),   Obtaining and/or reviewing separately obtained history, Documenting clinical information in the electronic or other health record,   Independently interpreting results (not separately reported) and communicating results to the patient/family/caregiver, or Care coordination (not separately reported).     Each patient to whom he or she provides medical services by telemedicine is:  (1) informed of the relationship between the physician and patient   and the respective role of any other health care provider with respect to management of the patient; and   (2) notified that he or she may decline to receive medical services by telemedicine and may withdraw from such care at any time.    Notes:            [1]   Current Outpatient Medications:     atorvastatin (LIPITOR) 40 MG tablet, TAKE 1 TABLET BY MOUTH EVERY OTHER DAY, Disp: 45 tablet, Rfl: 3    famotidine (PEPCID) 40 MG tablet, Take 1 tablet (40 mg total) by mouth once daily., Disp: 90 tablet, Rfl: 3  [2]   Social History  Socioeconomic History    Marital status:    Tobacco Use    Smoking status: Former     Types: Cigarettes     Passive exposure: Past    Smokeless tobacco: Never   Substance and Sexual Activity    Alcohol use: No    Drug use: No     Social Drivers of Health     Financial Resource Strain: Low Risk  (6/10/2024)    Overall Financial Resource Strain (CARDIA)     Difficulty of Paying Living Expenses: Not hard at all   Food Insecurity: No  Food Insecurity (6/10/2024)    Hunger Vital Sign     Worried About Running Out of Food in the Last Year: Never true     Ran Out of Food in the Last Year: Never true   Physical Activity: Inactive (6/10/2024)    Exercise Vital Sign     Days of Exercise per Week: 0 days     Minutes of Exercise per Session: 0 min   Stress: No Stress Concern Present (6/10/2024)    [employer] [place] [employer] - Occupational Stress Questionnaire     Feeling of Stress : Not at all   Housing Stability: Unknown (6/10/2024)    Housing Stability Vital Sign     Unable to Pay for Housing in the Last Year: No

## 2025-06-18 ENCOUNTER — OFFICE VISIT (OUTPATIENT)
Dept: NEUROLOGY | Facility: CLINIC | Age: 65
End: 2025-06-18
Payer: COMMERCIAL

## 2025-06-18 DIAGNOSIS — Z82.0 FAMILY HISTORY OF ALZHEIMER DISEASE: Primary | ICD-10-CM

## 2025-06-18 PROCEDURE — 1159F MED LIST DOCD IN RCRD: CPT | Mod: CPTII,95,, | Performed by: PSYCHIATRY & NEUROLOGY

## 2025-06-18 PROCEDURE — 98005 SYNCH AUDIO-VIDEO EST LOW 20: CPT | Mod: 95,,, | Performed by: PSYCHIATRY & NEUROLOGY

## 2025-06-18 PROCEDURE — 1160F RVW MEDS BY RX/DR IN RCRD: CPT | Mod: CPTII,95,, | Performed by: PSYCHIATRY & NEUROLOGY

## 2025-06-24 ENCOUNTER — RESULTS FOLLOW-UP (OUTPATIENT)
Dept: FAMILY MEDICINE | Facility: CLINIC | Age: 65
End: 2025-06-24

## 2025-08-07 DIAGNOSIS — M25.561 RIGHT KNEE PAIN, UNSPECIFIED CHRONICITY: Primary | ICD-10-CM

## 2025-08-15 ENCOUNTER — HOSPITAL ENCOUNTER (OUTPATIENT)
Dept: RADIOLOGY | Facility: HOSPITAL | Age: 65
Discharge: HOME | End: 2025-08-15
Attending: ORTHOPAEDIC SURGERY

## 2025-08-15 ENCOUNTER — OFFICE VISIT (OUTPATIENT)
Dept: ORTHOPEDICS | Facility: CLINIC | Age: 65
End: 2025-08-15

## 2025-08-15 VITALS — HEIGHT: 70 IN | WEIGHT: 193.31 LBS | BODY MASS INDEX: 27.67 KG/M2

## 2025-08-15 DIAGNOSIS — M25.561 RIGHT KNEE PAIN, UNSPECIFIED CHRONICITY: Primary | ICD-10-CM

## 2025-08-15 DIAGNOSIS — M25.561 RIGHT KNEE PAIN, UNSPECIFIED CHRONICITY: ICD-10-CM

## 2025-08-15 PROCEDURE — 73562 X-RAY EXAM OF KNEE 3: CPT | Mod: TC,LT

## 2025-08-15 PROCEDURE — 99999 PR PBB SHADOW E&M-EST. PATIENT-LVL III: CPT | Mod: PBBFAC,,, | Performed by: ORTHOPAEDIC SURGERY

## 2025-08-15 PROCEDURE — 99213 OFFICE O/P EST LOW 20 MIN: CPT | Mod: PBBFAC,25 | Performed by: ORTHOPAEDIC SURGERY

## 2025-08-15 PROCEDURE — 73564 X-RAY EXAM KNEE 4 OR MORE: CPT | Mod: 26,RT,, | Performed by: RADIOLOGY
